# Patient Record
Sex: FEMALE | Race: WHITE | Employment: FULL TIME | URBAN - METROPOLITAN AREA
[De-identification: names, ages, dates, MRNs, and addresses within clinical notes are randomized per-mention and may not be internally consistent; named-entity substitution may affect disease eponyms.]

---

## 2017-02-02 ENCOUNTER — HOSPITAL ENCOUNTER (OUTPATIENT)
Dept: MRI IMAGING | Age: 42
Discharge: HOME | End: 2017-02-02
Attending: PHYSICIAN ASSISTANT
Payer: COMMERCIAL

## 2017-02-02 DIAGNOSIS — S83.241A TEAR OF MEDIAL MENISCUS OF RIGHT KNEE, UNSPECIFIED TEAR TYPE, UNSPECIFIED WHETHER OLD OR CURRENT TEAR, INITIAL ENCOUNTER: ICD-10-CM

## 2017-02-02 DIAGNOSIS — M25.561 PAIN IN BOTH KNEES: ICD-10-CM

## 2017-02-02 DIAGNOSIS — M25.562 PAIN IN BOTH KNEES: ICD-10-CM

## 2017-02-02 PROCEDURE — 73721 MRI JNT OF LWR EXTRE W/O DYE: CPT

## 2017-02-11 NOTE — H&P
[facility]    PATIENT'S NAME: Lucita Fuentes   ATTENDING PHYSICIAN: Yudith Burgos M.D.    PATIENT ACCOUNT#:   [de-identified]    LOCATION:  [facility]  MEDICAL RECORD #:   [id number]       YOB: 1975  ADMISSION DATE:       02/24/2017    HIS degenerative changes greatest in the patellofemoral joint and secondarily in the medial compartment. Right knee revealed early degenerative changes.   The patient was diagnosed with moderate osteoarthritis of her left knee and early osteoarthritis of her r 124/76. HEENT:  Unremarkable. LUNGS:  Clear. HEART:  Normal S1, S2 without murmur. ABDOMEN:  Benign. EXTREMITIES:  Significant tenderness along the medial joint line of the right knee. The patient has no medial or lateral instability.   Lachman and

## 2017-02-13 RX ORDER — MULTIVIT-MIN/IRON/FOLIC ACID/K 18-600-40
CAPSULE ORAL DAILY
COMMUNITY

## 2017-02-21 ENCOUNTER — ANESTHESIA EVENT (OUTPATIENT)
Dept: SURGERY | Facility: HOSPITAL | Age: 42
End: 2017-02-21
Payer: COMMERCIAL

## 2017-02-24 ENCOUNTER — HOSPITAL ENCOUNTER (OUTPATIENT)
Facility: HOSPITAL | Age: 42
Setting detail: HOSPITAL OUTPATIENT SURGERY
Discharge: HOME | End: 2017-02-24
Attending: ORTHOPAEDIC SURGERY | Admitting: ORTHOPAEDIC SURGERY
Payer: COMMERCIAL

## 2017-02-24 ENCOUNTER — ANESTHESIA (OUTPATIENT)
Dept: SURGERY | Facility: HOSPITAL | Age: 42
End: 2017-02-24
Payer: COMMERCIAL

## 2017-02-24 ENCOUNTER — SURGERY (OUTPATIENT)
Age: 42
End: 2017-02-24

## 2017-02-24 VITALS
WEIGHT: 190.69 LBS | OXYGEN SATURATION: 100 % | HEIGHT: 65 IN | SYSTOLIC BLOOD PRESSURE: 136 MMHG | RESPIRATION RATE: 16 BRPM | DIASTOLIC BLOOD PRESSURE: 68 MMHG | BODY MASS INDEX: 31.77 KG/M2 | TEMPERATURE: 99 F | HEART RATE: 80 BPM

## 2017-02-24 LAB
POCT LOT NUMBER: NORMAL
POCT URINE PREGNANCY: NEGATIVE

## 2017-02-24 PROCEDURE — 3E0U33Z INTRODUCTION OF ANTI-INFLAMMATORY INTO JOINTS, PERCUTANEOUS APPROACH: ICD-10-PCS | Performed by: ORTHOPAEDIC SURGERY

## 2017-02-24 PROCEDURE — 81025 URINE PREGNANCY TEST: CPT | Performed by: ORTHOPAEDIC SURGERY

## 2017-02-24 PROCEDURE — 0SBC4ZZ EXCISION OF RIGHT KNEE JOINT, PERCUTANEOUS ENDOSCOPIC APPROACH: ICD-10-PCS | Performed by: ORTHOPAEDIC SURGERY

## 2017-02-24 PROCEDURE — 3E0U3BZ INTRODUCTION OF ANESTHETIC AGENT INTO JOINTS, PERCUTANEOUS APPROACH: ICD-10-PCS | Performed by: ORTHOPAEDIC SURGERY

## 2017-02-24 RX ORDER — SODIUM CHLORIDE, SODIUM LACTATE, POTASSIUM CHLORIDE, CALCIUM CHLORIDE 600; 310; 30; 20 MG/100ML; MG/100ML; MG/100ML; MG/100ML
INJECTION, SOLUTION INTRAVENOUS CONTINUOUS
Status: DISCONTINUED | OUTPATIENT
Start: 2017-02-24 | End: 2017-02-24

## 2017-02-24 RX ORDER — HYDROMORPHONE HYDROCHLORIDE 1 MG/ML
0.4 INJECTION, SOLUTION INTRAMUSCULAR; INTRAVENOUS; SUBCUTANEOUS EVERY 5 MIN PRN
Status: DISCONTINUED | OUTPATIENT
Start: 2017-02-24 | End: 2017-02-24

## 2017-02-24 RX ORDER — METHYLPREDNISOLONE ACETATE 80 MG/ML
INJECTION, SUSPENSION INTRA-ARTICULAR; INTRALESIONAL; INTRAMUSCULAR; SOFT TISSUE AS NEEDED
Status: DISCONTINUED | OUTPATIENT
Start: 2017-02-24 | End: 2017-02-24 | Stop reason: HOSPADM

## 2017-02-24 RX ORDER — DIPHENHYDRAMINE HYDROCHLORIDE 50 MG/ML
12.5 INJECTION INTRAMUSCULAR; INTRAVENOUS AS NEEDED
Status: DISCONTINUED | OUTPATIENT
Start: 2017-02-24 | End: 2017-02-24

## 2017-02-24 RX ORDER — METOCLOPRAMIDE HYDROCHLORIDE 5 MG/ML
10 INJECTION INTRAMUSCULAR; INTRAVENOUS AS NEEDED
Status: DISCONTINUED | OUTPATIENT
Start: 2017-02-24 | End: 2017-02-24

## 2017-02-24 RX ORDER — LABETALOL HYDROCHLORIDE 5 MG/ML
5 INJECTION, SOLUTION INTRAVENOUS EVERY 5 MIN PRN
Status: DISCONTINUED | OUTPATIENT
Start: 2017-02-24 | End: 2017-02-24

## 2017-02-24 RX ORDER — SCOLOPAMINE TRANSDERMAL SYSTEM 1 MG/1
1 PATCH, EXTENDED RELEASE TRANSDERMAL
Status: DISCONTINUED | OUTPATIENT
Start: 2017-02-24 | End: 2017-02-24 | Stop reason: HOSPADM

## 2017-02-24 RX ORDER — BUPIVACAINE HYDROCHLORIDE AND EPINEPHRINE 5; 5 MG/ML; UG/ML
INJECTION, SOLUTION EPIDURAL; INTRACAUDAL; PERINEURAL AS NEEDED
Status: DISCONTINUED | OUTPATIENT
Start: 2017-02-24 | End: 2017-02-24 | Stop reason: HOSPADM

## 2017-02-24 RX ORDER — TRAMADOL HYDROCHLORIDE 50 MG/1
100 TABLET ORAL ONCE AS NEEDED
Status: COMPLETED | OUTPATIENT
Start: 2017-02-24 | End: 2017-02-24

## 2017-02-24 RX ORDER — MIDAZOLAM HYDROCHLORIDE 1 MG/ML
1 INJECTION INTRAMUSCULAR; INTRAVENOUS EVERY 5 MIN PRN
Status: DISCONTINUED | OUTPATIENT
Start: 2017-02-24 | End: 2017-02-24

## 2017-02-24 RX ORDER — NALOXONE HYDROCHLORIDE 0.4 MG/ML
80 INJECTION, SOLUTION INTRAMUSCULAR; INTRAVENOUS; SUBCUTANEOUS AS NEEDED
Status: DISCONTINUED | OUTPATIENT
Start: 2017-02-24 | End: 2017-02-24

## 2017-02-24 RX ORDER — HYDROCODONE BITARTRATE AND ACETAMINOPHEN 5; 325 MG/1; MG/1
1 TABLET ORAL AS NEEDED
Status: DISCONTINUED | OUTPATIENT
Start: 2017-02-24 | End: 2017-02-24

## 2017-02-24 RX ORDER — ONDANSETRON 2 MG/ML
4 INJECTION INTRAMUSCULAR; INTRAVENOUS AS NEEDED
Status: DISCONTINUED | OUTPATIENT
Start: 2017-02-24 | End: 2017-02-24

## 2017-02-24 RX ORDER — HYDROCODONE BITARTRATE AND ACETAMINOPHEN 5; 325 MG/1; MG/1
1-2 TABLET ORAL EVERY 4 HOURS PRN
Qty: 40 TABLET | Refills: 0 | Status: SHIPPED | OUTPATIENT
Start: 2017-02-24 | End: 2017-03-06

## 2017-02-24 RX ORDER — MEPERIDINE HYDROCHLORIDE 25 MG/ML
12.5 INJECTION INTRAMUSCULAR; INTRAVENOUS; SUBCUTANEOUS AS NEEDED
Status: DISCONTINUED | OUTPATIENT
Start: 2017-02-24 | End: 2017-02-24

## 2017-02-24 RX ORDER — ACETAMINOPHEN 500 MG
1000 TABLET ORAL ONCE AS NEEDED
Status: DISCONTINUED | OUTPATIENT
Start: 2017-02-24 | End: 2017-02-24

## 2017-02-24 RX ORDER — HYDROCODONE BITARTRATE AND ACETAMINOPHEN 5; 325 MG/1; MG/1
2 TABLET ORAL AS NEEDED
Status: DISCONTINUED | OUTPATIENT
Start: 2017-02-24 | End: 2017-02-24

## 2017-02-24 RX ORDER — SCOLOPAMINE TRANSDERMAL SYSTEM 1 MG/1
PATCH, EXTENDED RELEASE TRANSDERMAL
Status: DISCONTINUED
Start: 2017-02-24 | End: 2017-02-24

## 2017-02-24 NOTE — INTERVAL H&P NOTE
Pre-op Diagnosis: TORN MEDIAL MENISCUS RIGHT KNEE AND OSTEOARTHRITIS LEFT KNEE    The above referenced H&P was reviewed by MICHAEL Che on 2/24/2017, the patient was examined and no significant changes have occurred in the patient's condition since th

## 2017-02-24 NOTE — ANESTHESIA PREPROCEDURE EVALUATION
PRE-OP EVALUATION    Patient Name: Camelia Bustillos    Pre-op Diagnosis: TORN MEDIAL MENISCUS RIGHT KNEE AND OSTEOARTHRITIS LEFT KNEE    Procedure(s):  RIGHT KNEE ARTHROSCOPY WITH PARTIAL MEDIAL MENISCECTOMY AND CORTISONE INJECTION TO LEFT KNEE    Surgeon Smokeless tobacco: Never Used    Alcohol Use: Yes    Comment: rare, 2-3 per month       Drug Use: No     Available pre-op labs reviewed.                Airway      Mallampati: II  Mouth opening: >3 FB  TM distance: > 6 cm  Neck ROM: full Cardiovascular

## 2017-02-24 NOTE — BRIEF OP NOTE
[address]  Brief Op Note     [place] Location: OR   [facility] [id number] MRN [id number]   Admission Date 2/24/2017 Operation Date 2/24/2017   Attending Physician Anthony Tovar MD Operating Physician UBALDO Garcia

## 2017-02-24 NOTE — ANESTHESIA POSTPROCEDURE EVALUATION
[address] Patient Status:  Hospital Outpatient Surgery   Age/Gender 39year old female MRN [id number]   [facility] SURGERY Attending Jarod Mcfarlane MD   Hosp Day # 0 PCP Fidelina lAmaguer MD       Anesthesia Post-op Not

## 2017-02-24 NOTE — OPERATIVE REPORT
[address]    PATIENT'S NAME: Eduardo Martinez   ATTENDING PHYSICIAN: Shayy Chaudhary M.D. OPERATING PHYSICIAN: Shayy Chuadhary M.D.    PATIENT ACCOUNT#:   [de-identified]    LOCATION:  [address] [place] [address] ED 10  MEDICAL RECORD #:   [id number]       DA patient had a small joint effusion which was evacuated. A second stab incision was along the lateral joint line just lateral to the patellar tendon. Through this stab incision, a dull trocar was passed and a cannula for the arthroscope.   The suprapatella with patient's father, , and son, and postoperative instructions were written. The patient tolerated the procedure well.       Dictated By Vic Monteiro M.D.  d: 02/24/2017 09:57:56  t: 02/24/2017 16:31:28  [facility] [id number]  KFW/    cc: Arabella Negro

## 2017-02-27 PROBLEM — Z47.89 ORTHOPEDIC AFTERCARE: Status: ACTIVE | Noted: 2017-02-27

## 2017-03-24 ENCOUNTER — HOSPITAL ENCOUNTER (OUTPATIENT)
Age: 42
Discharge: HOME | End: 2017-03-24
Attending: FAMILY MEDICINE
Payer: COMMERCIAL

## 2017-03-24 VITALS
DIASTOLIC BLOOD PRESSURE: 92 MMHG | TEMPERATURE: 99 F | SYSTOLIC BLOOD PRESSURE: 123 MMHG | BODY MASS INDEX: 32 KG/M2 | WEIGHT: 190 LBS | HEART RATE: 96 BPM | OXYGEN SATURATION: 99 % | RESPIRATION RATE: 16 BRPM

## 2017-03-24 DIAGNOSIS — H83.09 LABYRINTHITIS, UNSPECIFIED LATERALITY: ICD-10-CM

## 2017-03-24 DIAGNOSIS — J01.00 ACUTE MAXILLARY SINUSITIS, RECURRENCE NOT SPECIFIED: Primary | ICD-10-CM

## 2017-03-24 PROCEDURE — 99204 OFFICE O/P NEW MOD 45 MIN: CPT

## 2017-03-24 PROCEDURE — 99213 OFFICE O/P EST LOW 20 MIN: CPT

## 2017-03-24 RX ORDER — AMOXICILLIN AND CLAVULANATE POTASSIUM 875; 125 MG/1; MG/1
1 TABLET, FILM COATED ORAL 2 TIMES DAILY
Qty: 20 TABLET | Refills: 0 | Status: SHIPPED | OUTPATIENT
Start: 2017-03-24 | End: 2017-04-03

## 2017-03-24 RX ORDER — FLUTICASONE PROPIONATE 50 MCG
2 SPRAY, SUSPENSION (ML) NASAL DAILY
Qty: 16 G | Refills: 0 | Status: SHIPPED | OUTPATIENT
Start: 2017-03-24 | End: 2017-04-23

## 2017-03-24 RX ORDER — TRAMADOL HYDROCHLORIDE 50 MG/1
50 TABLET ORAL EVERY 6 HOURS PRN
COMMUNITY
End: 2018-07-13 | Stop reason: ALTCHOICE

## 2017-03-24 RX ORDER — MECLIZINE HYDROCHLORIDE 25 MG/1
25 TABLET ORAL 3 TIMES DAILY PRN
Qty: 9 TABLET | Refills: 0 | Status: SHIPPED | OUTPATIENT
Start: 2017-03-24 | End: 2017-03-27

## 2017-03-24 RX ORDER — ACETAMINOPHEN 500 MG
1000 TABLET ORAL ONCE
Status: COMPLETED | OUTPATIENT
Start: 2017-03-24 | End: 2017-03-24

## 2017-03-24 NOTE — ED PROVIDER NOTES
Patient Seen in: [id number] [facility]    History   Patient presents with:  Cough/URI    Stated Complaint: sinus pressure    HPI  38 yo F here with sinus congestion and drainage X 2 days and now getting worse   Overall symptoms started approxim HPI.  Constitutional and vital signs reviewed. All other systems reviewed and negative except as noted above. PSFH elements reviewed from today and agreed except as otherwise stated in HPI.     Physical Exam       ED Triage Vitals   BP 03/24/17 1107 risk with a cold.  If your symptoms worsen, you become short-of-breath, develop fever, worse cough etc, you must follow-up with a physician immediately or go to the emergency room or return to [facility] immediate care  · Mucinex to help with thinning out

## 2017-03-31 ENCOUNTER — CHARTING TRANS (OUTPATIENT)
Dept: OTHER | Age: 42
End: 2017-03-31

## 2017-04-02 ENCOUNTER — LAB SERVICES (OUTPATIENT)
Dept: OTHER | Age: 42
End: 2017-04-02

## 2017-04-02 ENCOUNTER — CHARTING TRANS (OUTPATIENT)
Dept: OTHER | Age: 42
End: 2017-04-02

## 2017-04-02 LAB — SKIN TEST, TB IN-OFFICE: NEGATIVE

## 2018-03-14 ENCOUNTER — APPOINTMENT (OUTPATIENT)
Dept: OTHER | Facility: HOSPITAL | Age: 43
End: 2018-03-14
Attending: FAMILY MEDICINE

## 2018-03-16 ENCOUNTER — APPOINTMENT (OUTPATIENT)
Dept: OTHER | Facility: HOSPITAL | Age: 43
End: 2018-03-16
Attending: PREVENTIVE MEDICINE

## 2018-07-13 ENCOUNTER — OFFICE VISIT (OUTPATIENT)
Dept: UROLOGY | Facility: HOSPITAL | Age: 43
End: 2018-07-13
Attending: OBSTETRICS & GYNECOLOGY
Payer: COMMERCIAL

## 2018-07-13 VITALS
BODY MASS INDEX: 31.65 KG/M2 | WEIGHT: 190 LBS | HEIGHT: 65 IN | SYSTOLIC BLOOD PRESSURE: 104 MMHG | DIASTOLIC BLOOD PRESSURE: 72 MMHG

## 2018-07-13 DIAGNOSIS — N81.2 UTEROVAGINAL PROLAPSE, INCOMPLETE: Primary | ICD-10-CM

## 2018-07-13 DIAGNOSIS — N39.3 STRESS INCONTINENCE: ICD-10-CM

## 2018-07-13 DIAGNOSIS — N81.84 PELVIC MUSCLE WASTING: ICD-10-CM

## 2018-07-13 LAB
BLOOD URINE: NEGATIVE
CONTROL RUN WITHIN 24 HOURS?: YES
LEUKOCYTE ESTERASE URINE: NEGATIVE
NITRITE URINE: NEGATIVE

## 2018-07-13 PROCEDURE — 99201 HC OUTPT EVAL AND MGNT NEW PT LEVEL 1: CPT

## 2018-07-13 PROCEDURE — 87086 URINE CULTURE/COLONY COUNT: CPT | Performed by: OBSTETRICS & GYNECOLOGY

## 2018-07-13 PROCEDURE — 81002 URINALYSIS NONAUTO W/O SCOPE: CPT

## 2018-07-13 NOTE — PROGRESS NOTES
Alicia Christian,   7/13/2018     Referred by self    Patient presents with:  Prolapse: pt c/o bulge in vagina, also leakign with activity and ont he way to the bathroom      HPI:  +GINA  + UUI  (GINA > UUI)  +prolapse, worsening  Splints for void & comfort mouth every 6 (six) hours as needed for Pain. Disp:  Rfl:      No facility-administered medications prior to visit.      Urogynecology Summary:  Urogynecology Summary  Prolapse: Yes  GINA: Yes  Urge Incontinence: Yes  Nocturia Frequency: 2  Frequency: Greate recurrent prolapse, recurrent stress incontinence, de basil OAB, pain, sexual dysfunction, voiding dysfunction, long-term catheterization and re-operation. Post-op restrictions and expectations reviewed.   Discussed dietary and behavioral modification, disc

## 2018-07-16 ENCOUNTER — TELEPHONE (OUTPATIENT)
Dept: UROLOGY | Facility: HOSPITAL | Age: 43
End: 2018-07-16

## 2018-08-29 ENCOUNTER — APPOINTMENT (OUTPATIENT)
Dept: LAB | Age: 43
End: 2018-08-29
Attending: OBSTETRICS & GYNECOLOGY
Payer: COMMERCIAL

## 2018-08-29 DIAGNOSIS — N81.4 UTEROVAGINAL PROLAPSE: ICD-10-CM

## 2018-08-29 LAB
ERYTHROCYTE [DISTWIDTH] IN BLOOD BY AUTOMATED COUNT: 13.1 % (ref 11.5–16)
HCT VFR BLD AUTO: 35.7 % (ref 34–50)
HGB BLD-MCNC: 12.4 G/DL (ref 12–16)
MCH RBC QN AUTO: 28.6 PG (ref 27–33.2)
MCHC RBC AUTO-ENTMCNC: 34.7 G/DL (ref 31–37)
MCV RBC AUTO: 82.4 FL (ref 81–100)
PLATELET # BLD AUTO: 247 10(3)UL (ref 150–450)
RBC # BLD AUTO: 4.33 X10(6)UL (ref 3.8–5.1)
RED CELL DISTRIBUTION WIDTH-SD: 38.7 FL (ref 35.1–46.3)
WBC # BLD AUTO: 7.6 X10(3) UL (ref 4–13)

## 2018-08-29 PROCEDURE — 85027 COMPLETE CBC AUTOMATED: CPT

## 2018-08-29 PROCEDURE — 36415 COLL VENOUS BLD VENIPUNCTURE: CPT

## 2018-09-05 ENCOUNTER — ANESTHESIA EVENT (OUTPATIENT)
Dept: SURGERY | Facility: HOSPITAL | Age: 43
End: 2018-09-05

## 2018-09-05 NOTE — H&P
44 y/o female with bothersome vaginal bulging and stress urinary incontinence. Symptoms worsening over time. Interested in surgical management. Completed childbearing, had essure procedure, wants fallopian tubes removed entirely.     She reports hole in la pain.  Discussed pain mgmt and potential need for narcotics. Discussed addiction potential with narcotics. IL  reviewed.     All questions answered  Will proceed with robotic assisted laparoscopic hysterectomy, bilateral salpingectomy, poss oophorectomy,

## 2018-09-06 ENCOUNTER — HOSPITAL ENCOUNTER (OUTPATIENT)
Facility: HOSPITAL | Age: 43
Setting detail: OBSERVATION
Discharge: HOME | End: 2018-09-08
Attending: OBSTETRICS & GYNECOLOGY | Admitting: OBSTETRICS & GYNECOLOGY
Payer: COMMERCIAL

## 2018-09-06 ENCOUNTER — ANESTHESIA (OUTPATIENT)
Dept: SURGERY | Facility: HOSPITAL | Age: 43
End: 2018-09-06

## 2018-09-06 DIAGNOSIS — N81.4 UTEROVAGINAL PROLAPSE: Primary | ICD-10-CM

## 2018-09-06 LAB
POCT LOT NUMBER: NORMAL
POCT URINE PREGNANCY: NEGATIVE

## 2018-09-06 PROCEDURE — 81025 URINE PREGNANCY TEST: CPT | Performed by: OBSTETRICS & GYNECOLOGY

## 2018-09-06 PROCEDURE — 0UT94ZZ RESECTION OF UTERUS, PERCUTANEOUS ENDOSCOPIC APPROACH: ICD-10-PCS | Performed by: OBSTETRICS & GYNECOLOGY

## 2018-09-06 PROCEDURE — 0TSD0ZZ REPOSITION URETHRA, OPEN APPROACH: ICD-10-PCS | Performed by: OBSTETRICS & GYNECOLOGY

## 2018-09-06 PROCEDURE — 88307 TISSUE EXAM BY PATHOLOGIST: CPT | Performed by: OBSTETRICS & GYNECOLOGY

## 2018-09-06 PROCEDURE — 0WQNXZZ REPAIR FEMALE PERINEUM, EXTERNAL APPROACH: ICD-10-PCS | Performed by: OBSTETRICS & GYNECOLOGY

## 2018-09-06 PROCEDURE — 0JQC0ZZ REPAIR PELVIC REGION SUBCUTANEOUS TISSUE AND FASCIA, OPEN APPROACH: ICD-10-PCS | Performed by: OBSTETRICS & GYNECOLOGY

## 2018-09-06 PROCEDURE — 0UT74ZZ RESECTION OF BILATERAL FALLOPIAN TUBES, PERCUTANEOUS ENDOSCOPIC APPROACH: ICD-10-PCS | Performed by: OBSTETRICS & GYNECOLOGY

## 2018-09-06 PROCEDURE — 8E0W4CZ ROBOTIC ASSISTED PROCEDURE OF TRUNK REGION, PERCUTANEOUS ENDOSCOPIC APPROACH: ICD-10-PCS | Performed by: OBSTETRICS & GYNECOLOGY

## 2018-09-06 PROCEDURE — 0UQF0ZZ REPAIR CUL-DE-SAC, OPEN APPROACH: ICD-10-PCS | Performed by: OBSTETRICS & GYNECOLOGY

## 2018-09-06 PROCEDURE — 0UBMXZZ EXCISION OF VULVA, EXTERNAL APPROACH: ICD-10-PCS | Performed by: OBSTETRICS & GYNECOLOGY

## 2018-09-06 PROCEDURE — S0028 INJECTION, FAMOTIDINE, 20 MG: HCPCS | Performed by: OBSTETRICS & GYNECOLOGY

## 2018-09-06 RX ORDER — CEFAZOLIN SODIUM/WATER 2 G/20 ML
2 SYRINGE (ML) INTRAVENOUS ONCE
Status: DISCONTINUED | OUTPATIENT
Start: 2018-09-06 | End: 2018-09-06 | Stop reason: HOSPADM

## 2018-09-06 RX ORDER — ACETAMINOPHEN 500 MG
1000 TABLET ORAL ONCE
Status: DISCONTINUED | OUTPATIENT
Start: 2018-09-06 | End: 2018-09-06 | Stop reason: HOSPADM

## 2018-09-06 RX ORDER — MEPERIDINE HYDROCHLORIDE 25 MG/ML
12.5 INJECTION INTRAMUSCULAR; INTRAVENOUS; SUBCUTANEOUS AS NEEDED
Status: COMPLETED | OUTPATIENT
Start: 2018-09-06 | End: 2018-09-06

## 2018-09-06 RX ORDER — MORPHINE SULFATE 4 MG/ML
INJECTION, SOLUTION INTRAMUSCULAR; INTRAVENOUS
Status: COMPLETED
Start: 2018-09-06 | End: 2018-09-06

## 2018-09-06 RX ORDER — ONDANSETRON 4 MG/1
4 TABLET, FILM COATED ORAL EVERY 8 HOURS PRN
Status: DISCONTINUED | OUTPATIENT
Start: 2018-09-06 | End: 2018-09-08

## 2018-09-06 RX ORDER — MORPHINE SULFATE 4 MG/ML
2 INJECTION, SOLUTION INTRAMUSCULAR; INTRAVENOUS EVERY 2 HOUR PRN
Status: DISCONTINUED | OUTPATIENT
Start: 2018-09-06 | End: 2018-09-08

## 2018-09-06 RX ORDER — FAMOTIDINE 10 MG/ML
20 INJECTION, SOLUTION INTRAVENOUS 2 TIMES DAILY
Status: DISCONTINUED | OUTPATIENT
Start: 2018-09-06 | End: 2018-09-08

## 2018-09-06 RX ORDER — ONDANSETRON 2 MG/ML
4 INJECTION INTRAMUSCULAR; INTRAVENOUS AS NEEDED
Status: DISCONTINUED | OUTPATIENT
Start: 2018-09-06 | End: 2018-09-06 | Stop reason: HOSPADM

## 2018-09-06 RX ORDER — DIPHENHYDRAMINE HYDROCHLORIDE 50 MG/ML
12.5 INJECTION INTRAMUSCULAR; INTRAVENOUS AS NEEDED
Status: DISCONTINUED | OUTPATIENT
Start: 2018-09-06 | End: 2018-09-06 | Stop reason: HOSPADM

## 2018-09-06 RX ORDER — MORPHINE SULFATE 4 MG/ML
4 INJECTION, SOLUTION INTRAMUSCULAR; INTRAVENOUS EVERY 2 HOUR PRN
Status: DISCONTINUED | OUTPATIENT
Start: 2018-09-06 | End: 2018-09-08

## 2018-09-06 RX ORDER — PSEUDOEPHEDRINE HCL 120 MG/1
120 TABLET, FILM COATED, EXTENDED RELEASE ORAL DAILY
Status: DISCONTINUED | OUTPATIENT
Start: 2018-09-06 | End: 2018-09-08

## 2018-09-06 RX ORDER — MIDAZOLAM HYDROCHLORIDE 1 MG/ML
1 INJECTION INTRAMUSCULAR; INTRAVENOUS EVERY 5 MIN PRN
Status: DISCONTINUED | OUTPATIENT
Start: 2018-09-06 | End: 2018-09-06 | Stop reason: HOSPADM

## 2018-09-06 RX ORDER — CETIRIZINE HYDROCHLORIDE 5 MG/1
5 TABLET ORAL DAILY
Status: DISCONTINUED | OUTPATIENT
Start: 2018-09-06 | End: 2018-09-08

## 2018-09-06 RX ORDER — DEXAMETHASONE SODIUM PHOSPHATE 4 MG/ML
4 VIAL (ML) INJECTION AS NEEDED
Status: DISCONTINUED | OUTPATIENT
Start: 2018-09-06 | End: 2018-09-06 | Stop reason: HOSPADM

## 2018-09-06 RX ORDER — ONDANSETRON 2 MG/ML
4 INJECTION INTRAMUSCULAR; INTRAVENOUS EVERY 8 HOURS PRN
Status: DISCONTINUED | OUTPATIENT
Start: 2018-09-06 | End: 2018-09-08

## 2018-09-06 RX ORDER — KETOROLAC TROMETHAMINE 30 MG/ML
30 INJECTION, SOLUTION INTRAMUSCULAR; INTRAVENOUS EVERY 6 HOURS
Status: DISCONTINUED | OUTPATIENT
Start: 2018-09-06 | End: 2018-09-07

## 2018-09-06 RX ORDER — KETOROLAC TROMETHAMINE 30 MG/ML
30 INJECTION, SOLUTION INTRAMUSCULAR; INTRAVENOUS EVERY 6 HOURS
Status: DISCONTINUED | OUTPATIENT
Start: 2018-09-06 | End: 2018-09-06

## 2018-09-06 RX ORDER — HYDROMORPHONE HYDROCHLORIDE 1 MG/ML
0.8 INJECTION, SOLUTION INTRAMUSCULAR; INTRAVENOUS; SUBCUTANEOUS EVERY 2 HOUR PRN
Status: DISCONTINUED | OUTPATIENT
Start: 2018-09-06 | End: 2018-09-06

## 2018-09-06 RX ORDER — HYDROMORPHONE HYDROCHLORIDE 1 MG/ML
0.2 INJECTION, SOLUTION INTRAMUSCULAR; INTRAVENOUS; SUBCUTANEOUS EVERY 2 HOUR PRN
Status: DISCONTINUED | OUTPATIENT
Start: 2018-09-06 | End: 2018-09-06

## 2018-09-06 RX ORDER — SODIUM CHLORIDE, SODIUM LACTATE, POTASSIUM CHLORIDE, CALCIUM CHLORIDE 600; 310; 30; 20 MG/100ML; MG/100ML; MG/100ML; MG/100ML
INJECTION, SOLUTION INTRAVENOUS CONTINUOUS
Status: DISCONTINUED | OUTPATIENT
Start: 2018-09-06 | End: 2018-09-06 | Stop reason: HOSPADM

## 2018-09-06 RX ORDER — MORPHINE SULFATE 4 MG/ML
2 INJECTION, SOLUTION INTRAMUSCULAR; INTRAVENOUS EVERY 5 MIN PRN
Status: DISCONTINUED | OUTPATIENT
Start: 2018-09-06 | End: 2018-09-06 | Stop reason: HOSPADM

## 2018-09-06 RX ORDER — DIPHENHYDRAMINE HYDROCHLORIDE 50 MG/ML
12.5 INJECTION INTRAMUSCULAR; INTRAVENOUS EVERY 4 HOURS PRN
Status: DISCONTINUED | OUTPATIENT
Start: 2018-09-06 | End: 2018-09-08

## 2018-09-06 RX ORDER — HEPARIN SODIUM 5000 [USP'U]/ML
5000 INJECTION, SOLUTION INTRAVENOUS; SUBCUTANEOUS ONCE
Status: COMPLETED | OUTPATIENT
Start: 2018-09-06 | End: 2018-09-06

## 2018-09-06 RX ORDER — BUPIVACAINE HYDROCHLORIDE AND EPINEPHRINE 2.5; 5 MG/ML; UG/ML
INJECTION, SOLUTION EPIDURAL; INFILTRATION; INTRACAUDAL; PERINEURAL AS NEEDED
Status: DISCONTINUED | OUTPATIENT
Start: 2018-09-06 | End: 2018-09-06

## 2018-09-06 RX ORDER — METOCLOPRAMIDE HYDROCHLORIDE 5 MG/ML
10 INJECTION INTRAMUSCULAR; INTRAVENOUS EVERY 8 HOURS PRN
Status: DISCONTINUED | OUTPATIENT
Start: 2018-09-06 | End: 2018-09-08

## 2018-09-06 RX ORDER — MEPERIDINE HYDROCHLORIDE 25 MG/ML
INJECTION INTRAMUSCULAR; INTRAVENOUS; SUBCUTANEOUS
Status: COMPLETED
Start: 2018-09-06 | End: 2018-09-06

## 2018-09-06 RX ORDER — HYDROMORPHONE HYDROCHLORIDE 1 MG/ML
0.4 INJECTION, SOLUTION INTRAMUSCULAR; INTRAVENOUS; SUBCUTANEOUS EVERY 2 HOUR PRN
Status: DISCONTINUED | OUTPATIENT
Start: 2018-09-06 | End: 2018-09-06

## 2018-09-06 RX ORDER — MORPHINE SULFATE 4 MG/ML
1 INJECTION, SOLUTION INTRAMUSCULAR; INTRAVENOUS EVERY 2 HOUR PRN
Status: DISCONTINUED | OUTPATIENT
Start: 2018-09-06 | End: 2018-09-08

## 2018-09-06 RX ORDER — BUPIVACAINE HYDROCHLORIDE 5 MG/ML
INJECTION, SOLUTION EPIDURAL; INTRACAUDAL AS NEEDED
Status: DISCONTINUED | OUTPATIENT
Start: 2018-09-06 | End: 2018-09-06

## 2018-09-06 RX ORDER — DIPHENHYDRAMINE HCL 25 MG
25 CAPSULE ORAL EVERY 6 HOURS PRN
Status: DISCONTINUED | OUTPATIENT
Start: 2018-09-06 | End: 2018-09-08

## 2018-09-06 RX ORDER — NALOXONE HYDROCHLORIDE 0.4 MG/ML
80 INJECTION, SOLUTION INTRAMUSCULAR; INTRAVENOUS; SUBCUTANEOUS AS NEEDED
Status: DISCONTINUED | OUTPATIENT
Start: 2018-09-06 | End: 2018-09-06 | Stop reason: HOSPADM

## 2018-09-06 RX ORDER — SODIUM CHLORIDE, SODIUM LACTATE, POTASSIUM CHLORIDE, CALCIUM CHLORIDE 600; 310; 30; 20 MG/100ML; MG/100ML; MG/100ML; MG/100ML
INJECTION, SOLUTION INTRAVENOUS CONTINUOUS
Status: DISCONTINUED | OUTPATIENT
Start: 2018-09-06 | End: 2018-09-08

## 2018-09-06 NOTE — OPERATIVE REPORT
Pre op: Uterovaginal prolapse, stress urinary incontinence, labial disruption  Post op: same + endometriosis    PROCEDURE PERFORMED:    Diagnostic Laparoscopy, Robotic assisted laparoscopic hysterectomy, bilateral salpingectomy, uterosacral vaginal vault s cut. Round ligament was clamped, cauterized, & cut. Broad ligament was , uterine vessels skeletonized and clamped/cauterized, and cut, this was done on either side without difficulty.  Bladder flap was created sharply and with cup of the v-care pap dissect the periurethral tissues towards the pubic rami bilaterally. The bladder was emptied, the Advantage Fit trocars were then used to place the guide sleeves into the retropubic space. Once the sleeves were in position, cystoscopy was undertaken.  No b the fascia at the umbilical incision.  The remaining sleeves were then removed under direct visualization.  The skin incisions were then closed with 4-0 Vicryl suture in a subcuticular fashion.  Dermabond was then applied to the incisions, and the patient

## 2018-09-06 NOTE — BRIEF OP NOTE
Pre-Operative Diagnosis: UTEROVAGINAL PROLAPSE, STRESS INCONTINENCE, RIGHT LABIAL DEFECT     Post-Operative Diagnosis: UTEROVAGINAL PROLAPSE, STRESS INCONTINENCE, ENDOMETRIOSIS, RIGHT LABIAL DEFECT   + endometriosis     Procedure Performed:   Procedure(s):

## 2018-09-06 NOTE — ANESTHESIA POSTPROCEDURE EVALUATION
[address] Patient Status:  Outpatient in a Bed   Age/Gender 43year old female MRN [id number]   [facility] SURGERY Attending Aura , [address] [facility] Day # 0 PCP Esperanza Carlson MD       Anesthesia Post-op Note    P

## 2018-09-06 NOTE — ANESTHESIA PREPROCEDURE EVALUATION
PRE-OP EVALUATION    Patient Name: Aaron Marino    Pre-op Diagnosis: UTEROVAGINAL PROLAPSE, STRESS INCONTINENCE      Procedure(s):  ROBOTIC ASSISTED LAPAROSCOPIC HYSTERECTOMY, BILATERAL SALPINGECTOMY, POSSIBLE BILATERAL OOPHORECTOMY, VAGINAL Sherine Shahid surgery, nose fx  No date: OTHER SURGICAL HISTORY Right      Comment: knee arthroscopy x 2  No date: OTHER SURGICAL HISTORY      Comment: hysteroscopy     Smoking status: Never Smoker    Smokeless tobacco: Never Used    Alcohol use Yes    Comment: 2-3 per

## 2018-09-07 LAB
BASOPHILS # BLD AUTO: 0.02 X10(3) UL (ref 0–0.1)
BASOPHILS NFR BLD AUTO: 0.1 %
EOSINOPHIL # BLD AUTO: 0 X10(3) UL (ref 0–0.3)
EOSINOPHIL NFR BLD AUTO: 0 %
ERYTHROCYTE [DISTWIDTH] IN BLOOD BY AUTOMATED COUNT: 13 % (ref 11.5–16)
HCT VFR BLD AUTO: 33.9 % (ref 34–50)
HGB BLD-MCNC: 11.9 G/DL (ref 12–16)
IMMATURE GRANULOCYTE COUNT: 0.04 X10(3) UL (ref 0–1)
IMMATURE GRANULOCYTE RATIO %: 0.3 %
LYMPHOCYTES # BLD AUTO: 1.16 X10(3) UL (ref 0.9–4)
LYMPHOCYTES NFR BLD AUTO: 8.6 %
MCH RBC QN AUTO: 28.6 PG (ref 27–33.2)
MCHC RBC AUTO-ENTMCNC: 35.1 G/DL (ref 31–37)
MCV RBC AUTO: 81.5 FL (ref 81–100)
MONOCYTES # BLD AUTO: 1.19 X10(3) UL (ref 0.1–1)
MONOCYTES NFR BLD AUTO: 8.8 %
NEUTROPHIL ABS PRELIM: 11.07 X10 (3) UL (ref 1.3–6.7)
NEUTROPHILS # BLD AUTO: 11.07 X10(3) UL (ref 1.3–6.7)
NEUTROPHILS NFR BLD AUTO: 82.2 %
PLATELET # BLD AUTO: 251 10(3)UL (ref 150–450)
RBC # BLD AUTO: 4.16 X10(6)UL (ref 3.8–5.1)
RED CELL DISTRIBUTION WIDTH-SD: 37.9 FL (ref 35.1–46.3)
WBC # BLD AUTO: 13.5 X10(3) UL (ref 4–13)

## 2018-09-07 PROCEDURE — 85025 COMPLETE CBC W/AUTO DIFF WBC: CPT | Performed by: OBSTETRICS & GYNECOLOGY

## 2018-09-07 PROCEDURE — S0028 INJECTION, FAMOTIDINE, 20 MG: HCPCS | Performed by: OBSTETRICS & GYNECOLOGY

## 2018-09-07 RX ORDER — PHENAZOPYRIDINE HYDROCHLORIDE 100 MG/1
100 TABLET, FILM COATED ORAL
Status: DISCONTINUED | OUTPATIENT
Start: 2018-09-07 | End: 2018-09-08

## 2018-09-07 RX ORDER — TRAMADOL HYDROCHLORIDE 50 MG/1
50 TABLET ORAL EVERY 6 HOURS PRN
Qty: 20 TABLET | Refills: 0 | Status: SHIPPED | OUTPATIENT
Start: 2018-09-07 | End: 2018-09-13

## 2018-09-07 RX ORDER — IBUPROFEN 600 MG/1
600 TABLET ORAL EVERY 6 HOURS PRN
Status: DISCONTINUED | OUTPATIENT
Start: 2018-09-07 | End: 2018-09-08

## 2018-09-07 RX ORDER — ONDANSETRON 4 MG/1
4 TABLET, FILM COATED ORAL EVERY 8 HOURS PRN
Qty: 20 TABLET | Refills: 1 | Status: SHIPPED | OUTPATIENT
Start: 2018-09-07 | End: 2018-09-13

## 2018-09-07 RX ORDER — HYDROCODONE BITARTRATE AND ACETAMINOPHEN 5; 325 MG/1; MG/1
1 TABLET ORAL EVERY 6 HOURS PRN
Status: DISCONTINUED | OUTPATIENT
Start: 2018-09-07 | End: 2018-09-08

## 2018-09-07 RX ORDER — ACETAMINOPHEN 325 MG/1
650 TABLET ORAL EVERY 6 HOURS PRN
Status: DISCONTINUED | OUTPATIENT
Start: 2018-09-07 | End: 2018-09-08

## 2018-09-07 RX ORDER — HYDROCODONE BITARTRATE AND ACETAMINOPHEN 5; 325 MG/1; MG/1
1 TABLET ORAL EVERY 6 HOURS PRN
Qty: 20 TABLET | Refills: 0 | Status: SHIPPED | OUTPATIENT
Start: 2018-09-07 | End: 2018-09-13

## 2018-09-07 RX ORDER — IBUPROFEN 600 MG/1
600 TABLET ORAL EVERY 6 HOURS PRN
Qty: 30 TABLET | Refills: 3 | Status: SHIPPED | OUTPATIENT
Start: 2018-09-07

## 2018-09-07 RX ORDER — TRAMADOL HYDROCHLORIDE 50 MG/1
50 TABLET ORAL EVERY 6 HOURS PRN
Status: DISCONTINUED | OUTPATIENT
Start: 2018-09-07 | End: 2018-09-08

## 2018-09-07 RX ORDER — PHENAZOPYRIDINE HYDROCHLORIDE 100 MG/1
100 TABLET, FILM COATED ORAL
Qty: 21 TABLET | Refills: 1 | Status: SHIPPED | OUTPATIENT
Start: 2018-09-07 | End: 2020-06-11 | Stop reason: ALTCHOICE

## 2018-09-07 NOTE — PROGRESS NOTES
Operative findings discussed.     Pain controlled, tolerating PO    POD 1 - RH, BS, repairs, MUS - doing well    PLAN - discussed pain mangement and catheter care

## 2018-09-07 NOTE — PAYOR COMM NOTE
--------------  ADMISSION REVIEW     Payor: [employer]  Subscriber #:  [id number]  Authorization Number: N/A    Admit date: N/A  Admit time: N/A       Admitting Physician: Rosa M Carter DO  Attending Physician:  DO UBALDO Santillan device to occlude fallopian tubes  1/1/1991: OTHER SURGICAL HISTORY      Comment: nose surgery, nose fx  No date: OTHER SURGICAL HISTORY Right      Comment: knee arthroscopy x 2  No date: OTHER SURGICAL HISTORY      Comment: hysteroscopy    No current faci Bupivacaine-EPINEPHrine (PF) (MARCAINE) 0.25% -[id number] injection     Date Action Dose Route User    9/6/2018 1239 Given 30 mL Infiltration Opal Cardoso DO      ceFAZolin sodium (ANCEF/KEFZOL) 2 GM/20ML premix IV syringe 2 g     Date Action Dose Rou Ferdinand Mcduffie RN    9/6/2018 1641 Given 12.5 mg Intravenous Merlin Majano, RN      morphINE sulfate (PF) 4 MG/ML injection 2 mg     Date Action Dose Route User    9/7/2018 0019 Given 2 mg Intravenous Lora Acosta RN    9/6/2018 2032 Given 2 mg Intravenous

## 2018-09-07 NOTE — PROGRESS NOTES
43year old y/o female s/p RALH b/l salpingectomy USVVS APE repair, MUS, cysto, labial revision POD #1  Pt doing well. She complains of suprapubic pressure & burning. Denies CP or SOB. Pain controlled with IV meds, morphine makes her feel funny.  Tolerates

## 2018-09-07 NOTE — PLAN OF CARE
PT RESTING IN BED. C/O OF OF ABD PAIN, REPORTED PAIN RELIEF AFTER PAIN MEDS. WILL TRANSITION TO PO PAIN MED'S TODAY. SAGASTUME WITH MODERATE CLEAR YELLOW URINE. PT WILL D/C WITH SAGASTUME. POC UPDATED,PT VERBALIZED UNDERSTANDING.

## 2018-09-07 NOTE — PLAN OF CARE
DR Urban Lopez NOTIFIED THAT PT FEELS FRUSTRATED THAT ORAL PAIN MEDS DON'T SEEM TO BE HELPING. PT WAS SUPPOSED TO D/C TODAY.  OK TO RESUME MORPHINE AND CANCELL D/C. POC UPDATED, PT VERBALIZED UNDERSTANDING.

## 2018-09-08 VITALS
BODY MASS INDEX: 31.58 KG/M2 | HEART RATE: 76 BPM | WEIGHT: 185 LBS | RESPIRATION RATE: 18 BRPM | HEIGHT: 64 IN | OXYGEN SATURATION: 97 % | SYSTOLIC BLOOD PRESSURE: 148 MMHG | TEMPERATURE: 98 F | DIASTOLIC BLOOD PRESSURE: 77 MMHG

## 2018-09-08 PROCEDURE — S0028 INJECTION, FAMOTIDINE, 20 MG: HCPCS | Performed by: OBSTETRICS & GYNECOLOGY

## 2018-09-08 RX ORDER — HYDROCODONE BITARTRATE AND ACETAMINOPHEN 5; 325 MG/1; MG/1
1 TABLET ORAL EVERY 4 HOURS PRN
Status: DISCONTINUED | OUTPATIENT
Start: 2018-09-08 | End: 2018-09-08

## 2018-09-08 NOTE — PROGRESS NOTES
Feels much better today     Pain controlled, tolerating general diet  Abd soft, NT  urine clear in Hoang    POD 2 - doing well    PLAN -  Hoang/leg bag teaching  Home today  F/U 1 week for catheter removal  D/C instructions reviewed

## 2018-09-08 NOTE — PLAN OF CARE
Discharge to home or other facility with appropriate resources Progressing      Verbalizes/displays adequate comfort level or patient's stated pain goal Progressing      Absence of fever/infection during anticipated neutropenic period Progressing      Free

## 2018-09-08 NOTE — DISCHARGE SUMMARY
[facility]  Discharge Summary    Cristopher Esparza Patient Status:  Observation    10/24/1975 MRN [id number]   [facility] 3NW-A Attending No att. providers found   [facility] Day # 0 PCP Jeff Roe MD     Date of Admission: 2018 Dispo 1,000 mg by mouth one time.  , Historical    Multiple Vitamins-Minerals (MULTI COMPLETE/IRON) Oral Tab  Take by mouth daily.   , Historical    Loratadine-Pseudoephedrine (CLARITIN-D 12 HOUR OR)  Take by mouth as needed.  , Historical          Follow up Visi

## 2018-09-09 ENCOUNTER — TELEPHONE (OUTPATIENT)
Dept: UROLOGY | Facility: HOSPITAL | Age: 43
End: 2018-09-09

## 2018-09-09 NOTE — TELEPHONE ENCOUNTER
TC to pt following surgery  Doing well, no complaints  Cath draining clear urine.  +BM  Pain controlled with PO meds (IBP & norco)  Reviewed bowel mgmt and activity restrictions  Tolerates ambulation  raymundo PO, low appetite  No heavy vaginal bleeding, scant s

## 2018-09-13 ENCOUNTER — OFFICE VISIT (OUTPATIENT)
Dept: UROLOGY | Facility: HOSPITAL | Age: 43
End: 2018-09-13
Attending: OBSTETRICS & GYNECOLOGY
Payer: COMMERCIAL

## 2018-09-13 VITALS
WEIGHT: 185 LBS | DIASTOLIC BLOOD PRESSURE: 78 MMHG | SYSTOLIC BLOOD PRESSURE: 118 MMHG | BODY MASS INDEX: 31.58 KG/M2 | HEIGHT: 64 IN

## 2018-09-13 DIAGNOSIS — Z98.890 POST-OPERATIVE STATE: ICD-10-CM

## 2018-09-13 DIAGNOSIS — N99.89 POSTOPERATIVE URINARY RETENTION: Primary | ICD-10-CM

## 2018-09-13 DIAGNOSIS — R33.8 POSTOPERATIVE URINARY RETENTION: Primary | ICD-10-CM

## 2018-09-13 PROCEDURE — 99212 OFFICE O/P EST SF 10 MIN: CPT

## 2018-09-13 NOTE — PROCEDURES
.Patient here for voiding trial.  Pt having regular BMs. Taking Motrin only. Hoang catheter drained and then using a 60 cc Bette syringe, 200ml sterile water was instilled per gravity, balloon deflated using 10 cc syringe, and catheter removed.   Pt up to

## 2018-09-15 ENCOUNTER — TELEPHONE (OUTPATIENT)
Dept: UROLOGY | Facility: HOSPITAL | Age: 43
End: 2018-09-15

## 2018-09-15 RX ORDER — SULFAMETHOXAZOLE AND TRIMETHOPRIM 800; 160 MG/1; MG/1
1 TABLET ORAL 2 TIMES DAILY
Qty: 14 TABLET | Refills: 0 | Status: SHIPPED | OUTPATIENT
Start: 2018-09-15 | End: 2018-09-22

## 2018-09-15 NOTE — TELEPHONE ENCOUNTER
Pt c/o s/sx of UTI  Voiding well after cath removal  Otherwise well  No fevers/chills  Bactrim DS BID x7d #14  Pyridium prn  Encouraged PO hydration   Bowels regular  Encouraged her to call with further questions or concerns  She understands and agrees to

## 2018-09-18 ENCOUNTER — OFFICE VISIT (OUTPATIENT)
Dept: UROLOGY | Facility: HOSPITAL | Age: 43
End: 2018-09-18
Attending: OBSTETRICS & GYNECOLOGY
Payer: COMMERCIAL

## 2018-09-18 VITALS
BODY MASS INDEX: 31.58 KG/M2 | DIASTOLIC BLOOD PRESSURE: 78 MMHG | HEIGHT: 64 IN | SYSTOLIC BLOOD PRESSURE: 122 MMHG | WEIGHT: 185 LBS

## 2018-09-18 DIAGNOSIS — Z98.890 POST-OPERATIVE STATE: Primary | ICD-10-CM

## 2018-09-18 PROCEDURE — 99211 OFF/OP EST MAY X REQ PHY/QHP: CPT

## 2018-09-18 RX ORDER — POLYETHYLENE GLYCOL 3350 17 G/17G
17 POWDER, FOR SOLUTION ORAL DAILY
COMMUNITY
End: 2020-06-11 | Stop reason: ALTCHOICE

## 2018-09-18 NOTE — PROGRESS NOTES
She is s/p Post-Op Summary  Procedure Date: 09/06/18  Procedure Name: Robotic Assisted Total Hysterectomy;Bilateral Salpingectomy;Uterosacral Ligament Suspension; Anterior/Posterior/Enterocele Repair;Cystoscopy;Prolene Mesh Mid Urethral Sling; Other (Please

## 2018-10-10 ENCOUNTER — TELEPHONE (OUTPATIENT)
Dept: UROLOGY | Facility: HOSPITAL | Age: 43
End: 2018-10-10

## 2018-10-10 NOTE — TELEPHONE ENCOUNTER
9/6/18 ROBOTIC ASSISTED LAPAROSCOPIC HYSTERECTOMY, BILATERAL SALPINGECTOMY, VAGINAL UTEROSACRAL VAGINAL VAULT SUSPENSION, ANTERIOR/ POSTERIOR/ENTEROCELE REPAIR, PLACEMENT OF MID URETHRAL SLING, CYSTOSCOPY , LABIAL REVISION  Patient having Labial pain from

## 2018-10-11 ENCOUNTER — NURSE ONLY (OUTPATIENT)
Dept: UROLOGY | Facility: HOSPITAL | Age: 43
End: 2018-10-11
Attending: OBSTETRICS & GYNECOLOGY
Payer: COMMERCIAL

## 2018-10-11 VITALS — DIASTOLIC BLOOD PRESSURE: 72 MMHG | TEMPERATURE: 98 F | SYSTOLIC BLOOD PRESSURE: 132 MMHG

## 2018-10-11 DIAGNOSIS — Z98.890 POST-OPERATIVE STATE: Primary | ICD-10-CM

## 2018-10-11 PROCEDURE — 99211 OFF/OP EST MAY X REQ PHY/QHP: CPT

## 2018-10-11 NOTE — PROGRESS NOTES
Patient reports she returned to work on Monday 10/8/18 - was on feet most of day - Since then experienced right labial sensation described as uncomfortable - \" area felt different\" - she looked in mirror and noticed separation in right labial tissue -no

## 2018-10-15 NOTE — PROGRESS NOTES
Pt c/o labial irritation, bleeding, and pain. Back to work    Gen NAD  CV RRR   Pulm nl effort  : right labial incision defect at prior closure, 8mm hole defect in previously repaired labia minora. Thin labial connection.     D/w pt options for mgmt - ex

## 2019-01-29 ENCOUNTER — APPOINTMENT (OUTPATIENT)
Dept: OTHER | Facility: HOSPITAL | Age: 44
End: 2019-01-29
Attending: PREVENTIVE MEDICINE

## 2019-02-04 ENCOUNTER — OFFICE VISIT (OUTPATIENT)
Dept: UROLOGY | Facility: HOSPITAL | Age: 44
End: 2019-02-04
Attending: OBSTETRICS & GYNECOLOGY
Payer: COMMERCIAL

## 2019-02-04 VITALS
HEIGHT: 64 IN | BODY MASS INDEX: 31.58 KG/M2 | WEIGHT: 185 LBS | DIASTOLIC BLOOD PRESSURE: 84 MMHG | SYSTOLIC BLOOD PRESSURE: 120 MMHG

## 2019-02-04 DIAGNOSIS — Z98.890 POST-OPERATIVE STATE: Primary | ICD-10-CM

## 2019-02-04 DIAGNOSIS — N90.89 LABIAL LESION: ICD-10-CM

## 2019-02-04 DIAGNOSIS — N81.84 PELVIC MUSCLE WASTING: ICD-10-CM

## 2019-02-04 PROCEDURE — 12001 RPR S/N/AX/GEN/TRNK 2.5CM/<: CPT | Performed by: OBSTETRICS & GYNECOLOGY

## 2019-02-04 NOTE — PROGRESS NOTES
She is s/p TVH w/ repairs, MUS, cysto  Doing well  she complains of left labia separation w/ hole, irritating and painful with physical activity  Voids freely  BMs regular  No other Pain, denies dyspareunia  Tolerates ambulation & diet  No leakage  No bulg

## 2019-02-05 ENCOUNTER — TELEPHONE (OUTPATIENT)
Dept: UROLOGY | Facility: HOSPITAL | Age: 44
End: 2019-02-05

## 2019-02-05 RX ORDER — TRAMADOL HYDROCHLORIDE 50 MG/1
50 TABLET ORAL EVERY 6 HOURS PRN
Qty: 12 TABLET | Refills: 0 | Status: SHIPPED
Start: 2019-02-05 | End: 2020-06-11 | Stop reason: ALTCHOICE

## 2019-02-05 NOTE — TELEPHONE ENCOUNTER
Pt s/p labial revision yesterday  Denies heavy bleeding  Reports pain, reviewed PO pain mgmt - will add tramadol  IBP q 6 hr  Ice to perineum prn  Will follow

## 2020-06-11 ENCOUNTER — OFFICE VISIT (OUTPATIENT)
Dept: FAMILY MEDICINE CLINIC | Facility: CLINIC | Age: 45
End: 2020-06-11
Payer: COMMERCIAL

## 2020-06-11 VITALS
TEMPERATURE: 97 F | HEIGHT: 64 IN | WEIGHT: 185.38 LBS | HEART RATE: 115 BPM | RESPIRATION RATE: 12 BRPM | DIASTOLIC BLOOD PRESSURE: 80 MMHG | SYSTOLIC BLOOD PRESSURE: 138 MMHG | BODY MASS INDEX: 31.65 KG/M2 | OXYGEN SATURATION: 99 %

## 2020-06-11 DIAGNOSIS — T23.172A: Primary | ICD-10-CM

## 2020-06-11 DIAGNOSIS — T23.102A: Primary | ICD-10-CM

## 2020-06-11 PROCEDURE — 99213 OFFICE O/P EST LOW 20 MIN: CPT | Performed by: NURSE PRACTITIONER
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
## 2020-06-18 ENCOUNTER — OFFICE VISIT (OUTPATIENT)
Dept: FAMILY MEDICINE CLINIC | Facility: CLINIC | Age: 45
End: 2020-06-18
Payer: COMMERCIAL

## 2020-06-18 VITALS
SYSTOLIC BLOOD PRESSURE: 144 MMHG | OXYGEN SATURATION: 98 % | TEMPERATURE: 98 F | DIASTOLIC BLOOD PRESSURE: 90 MMHG | WEIGHT: 186 LBS | BODY MASS INDEX: 32 KG/M2 | HEART RATE: 110 BPM

## 2020-06-18 DIAGNOSIS — T23.172A: Primary | ICD-10-CM

## 2020-06-18 DIAGNOSIS — T23.102A: Primary | ICD-10-CM

## 2020-06-18 PROCEDURE — 99213 OFFICE O/P EST LOW 20 MIN: CPT | Performed by: INTERNAL MEDICINE
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
## 2020-06-25 DIAGNOSIS — Z78.9 PARTICIPANT IN HEALTH AND WELLNESS PLAN: Primary | ICD-10-CM

## 2020-09-09 ENCOUNTER — OFFICE VISIT (OUTPATIENT)
Dept: FAMILY MEDICINE CLINIC | Facility: CLINIC | Age: 45
End: 2020-09-09
Payer: COMMERCIAL

## 2020-09-09 ENCOUNTER — HOSPITAL ENCOUNTER (OUTPATIENT)
Dept: MAMMOGRAPHY | Age: 45
Discharge: HOME | End: 2020-09-09
Attending: INTERNAL MEDICINE
Payer: COMMERCIAL

## 2020-09-09 ENCOUNTER — LABORATORY ENCOUNTER (OUTPATIENT)
Dept: LAB | Age: 45
End: 2020-09-09
Attending: INTERNAL MEDICINE
Payer: COMMERCIAL

## 2020-09-09 VITALS
BODY MASS INDEX: 30.66 KG/M2 | WEIGHT: 184 LBS | SYSTOLIC BLOOD PRESSURE: 122 MMHG | HEIGHT: 64.95 IN | HEART RATE: 78 BPM | DIASTOLIC BLOOD PRESSURE: 70 MMHG | RESPIRATION RATE: 16 BRPM | TEMPERATURE: 98 F | OXYGEN SATURATION: 96 %

## 2020-09-09 DIAGNOSIS — N95.1 HOT FLASHES DUE TO MENOPAUSE: ICD-10-CM

## 2020-09-09 DIAGNOSIS — Z00.00 WELL ADULT EXAM: ICD-10-CM

## 2020-09-09 DIAGNOSIS — Z00.00 WELL ADULT EXAM: Primary | ICD-10-CM

## 2020-09-09 LAB
ALBUMIN SERPL-MCNC: 3.8 G/DL (ref 3.4–5)
ALBUMIN/GLOB SERPL: 1.2 {RATIO} (ref 1–2)
ALP LIVER SERPL-CCNC: 65 U/L (ref 37–98)
ALT SERPL-CCNC: 15 U/L (ref 13–56)
ANION GAP SERPL CALC-SCNC: 3 MMOL/L (ref 0–18)
AST SERPL-CCNC: 14 U/L (ref 15–37)
BASOPHILS # BLD AUTO: 0.03 X10(3) UL (ref 0–0.2)
BASOPHILS NFR BLD AUTO: 0.4 %
BILIRUB SERPL-MCNC: 0.4 MG/DL (ref 0.1–2)
BUN BLD-MCNC: 7 MG/DL (ref 7–18)
BUN/CREAT SERPL: 10.9 (ref 10–20)
CALCIUM BLD-MCNC: 8.8 MG/DL (ref 8.5–10.1)
CHLORIDE SERPL-SCNC: 105 MMOL/L (ref 98–112)
CHOLEST SMN-MCNC: 152 MG/DL (ref ?–200)
CO2 SERPL-SCNC: 30 MMOL/L (ref 21–32)
CREAT BLD-MCNC: 0.64 MG/DL (ref 0.55–1.02)
DEPRECATED RDW RBC AUTO: 39.9 FL (ref 35.1–46.3)
EOSINOPHIL # BLD AUTO: 0.11 X10(3) UL (ref 0–0.7)
EOSINOPHIL NFR BLD AUTO: 1.6 %
ERYTHROCYTE [DISTWIDTH] IN BLOOD BY AUTOMATED COUNT: 13.1 % (ref 11–15)
GLOBULIN PLAS-MCNC: 3.3 G/DL (ref 2.8–4.4)
GLUCOSE BLD-MCNC: 94 MG/DL (ref 70–99)
HCT VFR BLD AUTO: 41 % (ref 35–48)
HDLC SERPL-MCNC: 48 MG/DL (ref 40–59)
HGB BLD-MCNC: 14.1 G/DL (ref 12–16)
IMM GRANULOCYTES # BLD AUTO: 0.01 X10(3) UL (ref 0–1)
IMM GRANULOCYTES NFR BLD: 0.1 %
LDLC SERPL CALC-MCNC: 76 MG/DL (ref ?–100)
LYMPHOCYTES # BLD AUTO: 1.85 X10(3) UL (ref 1–4)
LYMPHOCYTES NFR BLD AUTO: 26.7 %
M PROTEIN MFR SERPL ELPH: 7.1 G/DL (ref 6.4–8.2)
MCH RBC QN AUTO: 29.3 PG (ref 26–34)
MCHC RBC AUTO-ENTMCNC: 34.4 G/DL (ref 31–37)
MCV RBC AUTO: 85.2 FL (ref 80–100)
MONOCYTES # BLD AUTO: 0.64 X10(3) UL (ref 0.1–1)
MONOCYTES NFR BLD AUTO: 9.2 %
NEUTROPHILS # BLD AUTO: 4.3 X10 (3) UL (ref 1.5–7.7)
NEUTROPHILS # BLD AUTO: 4.3 X10(3) UL (ref 1.5–7.7)
NEUTROPHILS NFR BLD AUTO: 62 %
NONHDLC SERPL-MCNC: 104 MG/DL (ref ?–130)
OSMOLALITY SERPL CALC.SUM OF ELEC: 284 MOSM/KG (ref 275–295)
PATIENT FASTING Y/N/NP: YES
PATIENT FASTING Y/N/NP: YES
PLATELET # BLD AUTO: 277 10(3)UL (ref 150–450)
POTASSIUM SERPL-SCNC: 4.1 MMOL/L (ref 3.5–5.1)
RBC # BLD AUTO: 4.81 X10(6)UL (ref 3.8–5.3)
SODIUM SERPL-SCNC: 138 MMOL/L (ref 136–145)
T4 FREE SERPL-MCNC: 1 NG/DL (ref 0.8–1.7)
TRIGL SERPL-MCNC: 142 MG/DL (ref 30–149)
TSI SER-ACNC: 1.2 MIU/ML (ref 0.36–3.74)
VLDLC SERPL CALC-MCNC: 28 MG/DL (ref 0–30)
WBC # BLD AUTO: 6.9 X10(3) UL (ref 4–11)

## 2020-09-09 PROCEDURE — 84439 ASSAY OF FREE THYROXINE: CPT | Performed by: INTERNAL MEDICINE

## 2020-09-09 PROCEDURE — 3008F BODY MASS INDEX DOCD: CPT | Performed by: INTERNAL MEDICINE

## 2020-09-09 PROCEDURE — 77067 SCR MAMMO BI INCL CAD: CPT | Performed by: INTERNAL MEDICINE

## 2020-09-09 PROCEDURE — 99396 PREV VISIT EST AGE 40-64: CPT | Performed by: INTERNAL MEDICINE

## 2020-09-09 PROCEDURE — 80050 GENERAL HEALTH PANEL: CPT | Performed by: INTERNAL MEDICINE

## 2020-09-09 PROCEDURE — 36415 COLL VENOUS BLD VENIPUNCTURE: CPT | Performed by: INTERNAL MEDICINE

## 2020-09-09 PROCEDURE — 80061 LIPID PANEL: CPT | Performed by: INTERNAL MEDICINE

## 2020-09-09 RX ORDER — VENLAFAXINE HYDROCHLORIDE 37.5 MG/1
37.5 CAPSULE, EXTENDED RELEASE ORAL DAILY
Qty: 90 CAPSULE | Refills: 0 | Status: SHIPPED | OUTPATIENT
Start: 2020-09-09 | End: 2020-12-02

## 2020-09-09 NOTE — PROGRESS NOTES
HPI:   Cinthia Hollis is a 40year old female who presents for a complete physical exam. Symptoms: denies discharge, itching, burning or dysuria, is S/P ERWIN, ovaries preserved. Patient complains of emotional liability.   Increased irritability and crying Procedure Laterality Date   • ANTERIOR POSTERIOR REPAIR N/A 9/6/2018    Performed by Felicita Cobian DO at [facility] MAIN OR   • ANTERIOR REPAIR     • ENTEROCELE REPAIR     • FEMUR/KNEE SURG UNLISTED Left 01/01/2006    knee surgery, arthroscopy    • HYSTERECT asthma    EXAM:   /70   Pulse 78   Temp 98.2 °F (36.8 °C) (Temporal)   Resp 16   Ht 64.95\"   Wt 184 lb (83.5 kg)   LMP 08/20/2018   SpO2 96%   BMI 30.67 kg/m²   Body mass index is 30.67 kg/m².    GENERAL: well developed, well nourished,in no apparent

## 2020-09-16 ENCOUNTER — HOSPITAL ENCOUNTER (OUTPATIENT)
Dept: MAMMOGRAPHY | Facility: HOSPITAL | Age: 45
Discharge: HOME | End: 2020-09-16
Attending: INTERNAL MEDICINE
Payer: COMMERCIAL

## 2020-09-16 DIAGNOSIS — R92.8 ABNORMAL MAMMOGRAM: ICD-10-CM

## 2020-09-16 PROCEDURE — 77062 BREAST TOMOSYNTHESIS BI: CPT | Performed by: INTERNAL MEDICINE

## 2020-09-16 PROCEDURE — 77066 DX MAMMO INCL CAD BI: CPT | Performed by: INTERNAL MEDICINE

## 2020-09-16 PROCEDURE — 76642 ULTRASOUND BREAST LIMITED: CPT | Performed by: INTERNAL MEDICINE

## 2020-12-02 RX ORDER — VENLAFAXINE HYDROCHLORIDE 37.5 MG/1
37.5 CAPSULE, EXTENDED RELEASE ORAL DAILY
Qty: 90 CAPSULE | Refills: 0 | Status: SHIPPED | OUTPATIENT
Start: 2020-12-02 | End: 2021-08-02

## 2020-12-02 NOTE — TELEPHONE ENCOUNTER
Last office visit: 9/9/20  Last refill: 9/9/20  No future appointments.    Name from pharmacy: Venlafaxine Hcl Er 24hr 37.5 Mg Cap Sandra         Will file in chart as: VENLAFAXINE HCL ER 37.5 MG Oral Capsule SR 24 Hr    Sig: Take 1 capsule (37.5 mg total) by

## 2021-01-04 ENCOUNTER — IMMUNIZATION (OUTPATIENT)
Dept: LAB | Facility: HOSPITAL | Age: 46
End: 2021-01-04
Payer: COMMERCIAL

## 2021-01-04 DIAGNOSIS — Z23 NEED FOR VACCINATION: ICD-10-CM

## 2021-01-04 PROCEDURE — 0011A SARSCOV2 VAC 100MCG/0.5ML IM: CPT

## 2021-01-27 ENCOUNTER — PATIENT MESSAGE (OUTPATIENT)
Dept: FAMILY MEDICINE CLINIC | Facility: CLINIC | Age: 46
End: 2021-01-27

## 2021-01-27 RX ORDER — VENLAFAXINE HYDROCHLORIDE 37.5 MG/1
37.5 CAPSULE, EXTENDED RELEASE ORAL DAILY
Qty: 90 CAPSULE | Refills: 0 | OUTPATIENT
Start: 2021-01-27 | End: 2021-04-27

## 2021-01-27 RX ORDER — VENLAFAXINE HYDROCHLORIDE 37.5 MG/1
37.5 CAPSULE, EXTENDED RELEASE ORAL DAILY
Qty: 90 CAPSULE | Refills: 3 | Status: SHIPPED | OUTPATIENT
Start: 2021-01-27 | End: 2022-01-13

## 2021-01-27 NOTE — TELEPHONE ENCOUNTER
Last refill #90 on 12/3/220 - patient should have enough medication until on or around 3/3/2021  Refill denied.

## 2021-01-27 NOTE — TELEPHONE ENCOUNTER
From: Gaurav Huerta  To: Shira Piper MD  Sent: 1/27/2021 10:11 AM CST  Subject: Prescription Question    Hi,   I tried to get my Effexor script transferred to [facility] because it’s more cost effective. But it was denied.  I’m not sure why it wa

## 2021-02-01 ENCOUNTER — IMMUNIZATION (OUTPATIENT)
Dept: LAB | Facility: HOSPITAL | Age: 46
End: 2021-02-01
Attending: PREVENTIVE MEDICINE
Payer: COMMERCIAL

## 2021-02-01 DIAGNOSIS — Z23 NEED FOR VACCINATION: Primary | ICD-10-CM

## 2021-02-01 PROCEDURE — 0012A SARSCOV2 VAC 100MCG/0.5ML IM: CPT

## 2021-02-15 ENCOUNTER — TELEPHONE (OUTPATIENT)
Dept: UROLOGY | Facility: HOSPITAL | Age: 46
End: 2021-02-15

## 2021-02-15 RX ORDER — NITROFURANTOIN 25; 75 MG/1; MG/1
100 CAPSULE ORAL 2 TIMES DAILY
Qty: 14 CAPSULE | Refills: 0 | Status: SHIPPED | OUTPATIENT
Start: 2021-02-15 | End: 2021-02-22

## 2021-02-15 NOTE — TELEPHONE ENCOUNTER
TC from pt with c/o s/sx of UTI  Urinary urgency/freqency with dysuria    No fevers/chills  Reviewed allergies  Empiric NTF 100mg po bid x7d sent to pharm  OTC AZO  Will need ucx if sx persist  if recurrent may need additional mgmt options    Advised PO hy

## 2021-03-03 ENCOUNTER — OFFICE VISIT (OUTPATIENT)
Dept: FAMILY MEDICINE CLINIC | Facility: CLINIC | Age: 46
End: 2021-03-03
Payer: COMMERCIAL

## 2021-03-03 VITALS
OXYGEN SATURATION: 98 % | HEIGHT: 65 IN | DIASTOLIC BLOOD PRESSURE: 80 MMHG | BODY MASS INDEX: 30.99 KG/M2 | TEMPERATURE: 98 F | WEIGHT: 186 LBS | SYSTOLIC BLOOD PRESSURE: 126 MMHG | RESPIRATION RATE: 16 BRPM | HEART RATE: 112 BPM

## 2021-03-03 DIAGNOSIS — G43.009 MIGRAINE WITHOUT AURA AND WITHOUT STATUS MIGRAINOSUS, NOT INTRACTABLE: Primary | ICD-10-CM

## 2021-03-03 PROBLEM — G43.109 MIGRAINE WITH AURA AND WITHOUT STATUS MIGRAINOSUS, NOT INTRACTABLE: Status: ACTIVE | Noted: 2021-03-03

## 2021-03-03 PROCEDURE — 3008F BODY MASS INDEX DOCD: CPT | Performed by: INTERNAL MEDICINE

## 2021-03-03 PROCEDURE — 99214 OFFICE O/P EST MOD 30 MIN: CPT | Performed by: INTERNAL MEDICINE

## 2021-03-03 PROCEDURE — 3079F DIAST BP 80-89 MM HG: CPT | Performed by: INTERNAL MEDICINE

## 2021-03-03 PROCEDURE — 3074F SYST BP LT 130 MM HG: CPT | Performed by: INTERNAL MEDICINE

## 2021-03-03 RX ORDER — SUMATRIPTAN 100 MG/1
100 TABLET, FILM COATED ORAL EVERY 2 HOUR PRN
Qty: 9 TABLET | Refills: 1 | Status: SHIPPED | OUTPATIENT
Start: 2021-03-03 | End: 2021-05-11

## 2021-03-03 NOTE — PROGRESS NOTES
Alexandra Barlow is a 39year old female. HPI:   Pt had  COVID vaccine and ever since suffered with HA. She had not had these prior to her immunization. SHe had to miss consecutive days and still intermittently has HA that require treatment.  She has had 112   Temp 97.6 °F (36.4 °C) (Temporal)   Resp 16   Ht 5' 5\" (1.651 m)   Wt 186 lb (84.4 kg)   LMP 08/20/2018   SpO2 98%   BMI 30.95 kg/m²   GENERAL: well developed, well nourished,in no apparent distress  SKIN: no rashes,no suspicious lesions  HEENT: atr

## 2021-03-05 ENCOUNTER — PATIENT MESSAGE (OUTPATIENT)
Dept: FAMILY MEDICINE CLINIC | Facility: CLINIC | Age: 46
End: 2021-03-05

## 2021-03-05 NOTE — TELEPHONE ENCOUNTER
From: Tiana Lazo  To: Zeenat Sims MD  Sent: 3/5/2021 11:27 AM CST  Subject: Non-Urgent Medical Question    Hi,   They said my FMLA was incomplete. Where can I email the letter so it can get corrected.   Thanks , Ludwig Urena

## 2021-03-11 ENCOUNTER — TELEPHONE (OUTPATIENT)
Dept: FAMILY MEDICINE CLINIC | Facility: CLINIC | Age: 46
End: 2021-03-11

## 2021-03-11 NOTE — TELEPHONE ENCOUNTER
Patient said that her employer never go the paperwork. Form re-faxed to [phone] Leave #[id number].

## 2021-03-12 ENCOUNTER — PATIENT MESSAGE (OUTPATIENT)
Dept: FAMILY MEDICINE CLINIC | Facility: CLINIC | Age: 46
End: 2021-03-12

## 2021-03-12 NOTE — TELEPHONE ENCOUNTER
From: Jey Delgadillo  To: Fidelina Almaguer MD  Sent: 3/12/2021 9:10 AM CST  Subject: Non-Urgent Medical Question    Hi,   I have attached a picture of the changes that need to be made pls. This is the last chance I get to amend this paperwork.  Please if you

## 2021-03-12 NOTE — TELEPHONE ENCOUNTER
Lydiae should be calling, she can tell you where the original forms are and they can be addended by nurse and initialed with TG

## 2021-03-13 NOTE — TELEPHONE ENCOUNTER
From: Luis Daniel Sanchez  To: Sonal Lugo MD  Sent: 3/12/2021 5:48 PM CST  Subject: Non-Urgent Medical Question    Hi,   They denied it again for non qualified event. Thanks for faxing & refaxing.    Thanks, Aurelio

## 2021-03-18 ENCOUNTER — MED REC SCAN ONLY (OUTPATIENT)
Dept: FAMILY MEDICINE CLINIC | Facility: CLINIC | Age: 46
End: 2021-03-18

## 2021-04-06 DIAGNOSIS — N64.52 DISCHARGE FROM RIGHT NIPPLE: Primary | ICD-10-CM

## 2021-04-09 ENCOUNTER — OFFICE VISIT (OUTPATIENT)
Dept: SURGERY | Facility: CLINIC | Age: 46
End: 2021-04-09
Payer: COMMERCIAL

## 2021-04-09 VITALS
HEART RATE: 90 BPM | RESPIRATION RATE: 16 BRPM | DIASTOLIC BLOOD PRESSURE: 90 MMHG | OXYGEN SATURATION: 95 % | HEIGHT: 64.5 IN | WEIGHT: 186 LBS | SYSTOLIC BLOOD PRESSURE: 147 MMHG | BODY MASS INDEX: 31.37 KG/M2

## 2021-04-09 DIAGNOSIS — N64.52 NIPPLE DISCHARGE: Primary | ICD-10-CM

## 2021-04-09 DIAGNOSIS — Z80.3 FAMILY HISTORY OF BREAST CANCER: ICD-10-CM

## 2021-04-09 NOTE — PROGRESS NOTES
Breast Surgery New Patient Consultation    This is the first visit for this 39year old woman, referred by Dr. Larry Kerns, who presents for evaluation of nipple concerns.     History of Present Illness:   Ms. Tiana Lazo is a 39year old woman who presents w MAIN OR   • MIDURETHRAL SLING     • OTHER SURGICAL HISTORY  1/1/1991    nose surgery, nose fx   • OTHER SURGICAL HISTORY Right     knee arthroscopy x 2   • OTHER SURGICAL HISTORY      hysteroscopy   • POSTERIOR REPAIR     • ROBOT-ASSISTED LAPAROSCOPIC HYST Former Smoker   She is  and has 3 sons. Review of Systems:  General:   The patient denies, fever, chills, night sweats, fatigue, generalized weakness, change in appetite or weight loss.     HEENT:     The patient denies eye irritation, cataracts, pain.    Neuropsychiatric:  There is no history of +migraines or severe headaches, seizure/epilepsy, speech problems, coordination problems, trembling/tremors, fainting/black outs, dizziness, memory problems, loss of sensation/numbness, problems walking, w dimpling with movement of the pectoralis. There is no nipple retraction. No nipple discharge can be elicited. The parenchyma is mildly nodular. There are no dominant masses in the breast. The axillary tail is normal.    Abdomen:   The abdomen is soft, flat plan will be dictated accordingly. She was given ample opportunity for questions and those questions were answered to her satisfaction. She has been  encouraged to contact the office with any questions or concerns prior to her next appointment.

## 2021-04-12 ENCOUNTER — HOSPITAL ENCOUNTER (OUTPATIENT)
Dept: MRI IMAGING | Facility: HOSPITAL | Age: 46
Discharge: HOME | End: 2021-04-12
Attending: SURGERY
Payer: COMMERCIAL

## 2021-04-12 DIAGNOSIS — N64.52 DISCHARGE FROM RIGHT NIPPLE: ICD-10-CM

## 2021-04-12 PROCEDURE — 77049 MRI BREAST C-+ W/CAD BI: CPT | Performed by: SURGERY

## 2021-04-12 PROCEDURE — A9575 INJ GADOTERATE MEGLUMI 0.1ML: HCPCS | Performed by: SURGERY

## 2021-05-04 ENCOUNTER — PATIENT MESSAGE (OUTPATIENT)
Dept: FAMILY MEDICINE CLINIC | Facility: CLINIC | Age: 46
End: 2021-05-04

## 2021-05-04 DIAGNOSIS — Z20.822 COVID-19 RULED OUT: Primary | ICD-10-CM

## 2021-05-04 NOTE — TELEPHONE ENCOUNTER
From: Chata Mata  To: Jeff Roe MD  Sent: 5/4/2021 2:54 PM CDT  Subject: Non-Urgent Medical Question    Hi,   I am going to [place] for a medical mission 5/13. I need to get a COVID test next Monday or Tuesday.  Can you order one or am I better off

## 2021-05-11 RX ORDER — SUMATRIPTAN 100 MG/1
100 TABLET, FILM COATED ORAL EVERY 2 HOUR PRN
Qty: 9 TABLET | Refills: 1 | Status: SHIPPED | OUTPATIENT
Start: 2021-05-11 | End: 2021-08-24

## 2021-05-11 NOTE — TELEPHONE ENCOUNTER
Last refill: 03/03/21  Qty: 9 tabs  W/  1 refills  Last ov: 03/03/21    Requested Prescriptions     Pending Prescriptions Disp Refills   • SUMAtriptan Succinate (IMITREX) 100 MG Oral Tab 9 tablet 1     Sig: Take 1 tablet (100 mg total) by mouth every 2 (tw

## 2021-07-05 ENCOUNTER — PATIENT MESSAGE (OUTPATIENT)
Dept: FAMILY MEDICINE CLINIC | Facility: CLINIC | Age: 46
End: 2021-07-05

## 2021-07-06 NOTE — TELEPHONE ENCOUNTER
From: Wily Sanchez  To: Shelly Anthony MD  Sent: 7/5/2021 7:15 PM CDT  Subject: Non-Urgent Medical Question    Hi,   I am waiting for my Effexor to come I the mail. I am day 2 without it & am starting to have withdrawal symptoms.  Is there anything I can

## 2021-07-31 NOTE — TELEPHONE ENCOUNTER
LOV 03/03/2021      LAST RX 01/27/2021 90 capsules 3 refills     Next OV   Future Appointments   Date Time Provider Roxy Vasquez   8/11/2021  7:45 AM Vick Gamino MD [facility]         PROTOCOL none

## 2021-08-02 RX ORDER — VENLAFAXINE HYDROCHLORIDE 37.5 MG/1
37.5 CAPSULE, EXTENDED RELEASE ORAL DAILY
Qty: 90 CAPSULE | Refills: 0 | Status: SHIPPED | OUTPATIENT
Start: 2021-08-02 | End: 2021-09-15

## 2021-08-23 NOTE — TELEPHONE ENCOUNTER
LOV   03/03/2021    LAST LAB  09/09/2020     LAST RX  05/11/2021    Next OV    Future Appointments   Date Time Provider Roxy Vasquez   2/9/2022  7:30 AM Roxana Zuniga MD [facility]         PROTOCOL  NO PROTOCOL

## 2021-08-24 RX ORDER — SUMATRIPTAN 100 MG/1
100 TABLET, FILM COATED ORAL EVERY 2 HOUR PRN
Qty: 9 TABLET | Refills: 1 | Status: SHIPPED | OUTPATIENT
Start: 2021-08-24 | End: 2021-08-26

## 2021-08-26 RX ORDER — SUMATRIPTAN 100 MG/1
100 TABLET, FILM COATED ORAL EVERY 2 HOUR PRN
Qty: 9 TABLET | Refills: 1 | Status: SHIPPED | OUTPATIENT
Start: 2021-08-26

## 2021-09-14 ENCOUNTER — PATIENT MESSAGE (OUTPATIENT)
Dept: FAMILY MEDICINE CLINIC | Facility: CLINIC | Age: 46
End: 2021-09-14

## 2021-09-14 DIAGNOSIS — Z12.11 COLON CANCER SCREENING: Primary | ICD-10-CM

## 2021-09-15 ENCOUNTER — PATIENT MESSAGE (OUTPATIENT)
Dept: FAMILY MEDICINE CLINIC | Facility: CLINIC | Age: 46
End: 2021-09-15

## 2021-09-15 RX ORDER — VENLAFAXINE HYDROCHLORIDE 37.5 MG/1
37.5 CAPSULE, EXTENDED RELEASE ORAL DAILY
Qty: 90 CAPSULE | Refills: 0 | Status: SHIPPED | OUTPATIENT
Start: 2021-09-15 | End: 2021-12-14

## 2021-09-15 NOTE — TELEPHONE ENCOUNTER
From: Marilu Mathews  To: Chance Peguero MD  Sent: 9/14/2021 9:38 AM CDT  Subject: GI     Hi, who do you recommend for a colonoscopy?

## 2021-09-15 NOTE — TELEPHONE ENCOUNTER
LOV: 3-3-2021    LAST LAB:9-9-2020    LAST RX:    Medication Quantity Refills Start End   VENLAFAXINE 37.5 MG Oral Capsule SR 24 Hr 90 capsule 0 8/2/2021 10/31/2021   Sig:   Take 1 capsule (37.5 mg total) by mouth daily.      Route:   Oral         Next OV:

## 2021-09-15 NOTE — TELEPHONE ENCOUNTER
From: [facility]  Sent: 9/15/2021 5:01 PM CDT  To: Jorge Servin Clinical Staff  Subject: GI     Thanks

## 2021-10-06 ENCOUNTER — OFFICE VISIT (OUTPATIENT)
Dept: FAMILY MEDICINE CLINIC | Facility: CLINIC | Age: 46
End: 2021-10-06
Payer: COMMERCIAL

## 2021-10-06 VITALS
WEIGHT: 187.5 LBS | BODY MASS INDEX: 31.62 KG/M2 | HEIGHT: 64.5 IN | OXYGEN SATURATION: 98 % | SYSTOLIC BLOOD PRESSURE: 132 MMHG | RESPIRATION RATE: 18 BRPM | TEMPERATURE: 98 F | HEART RATE: 81 BPM | DIASTOLIC BLOOD PRESSURE: 78 MMHG

## 2021-10-06 DIAGNOSIS — Z00.00 WELL ADULT EXAM: ICD-10-CM

## 2021-10-06 DIAGNOSIS — Z23 NEED FOR VACCINATION: Primary | ICD-10-CM

## 2021-10-06 PROCEDURE — 90686 IIV4 VACC NO PRSV 0.5 ML IM: CPT | Performed by: INTERNAL MEDICINE

## 2021-10-06 PROCEDURE — 99396 PREV VISIT EST AGE 40-64: CPT | Performed by: INTERNAL MEDICINE

## 2021-10-06 PROCEDURE — 3008F BODY MASS INDEX DOCD: CPT | Performed by: INTERNAL MEDICINE

## 2021-10-06 PROCEDURE — 90471 IMMUNIZATION ADMIN: CPT | Performed by: INTERNAL MEDICINE

## 2021-10-06 PROCEDURE — 3075F SYST BP GE 130 - 139MM HG: CPT | Performed by: INTERNAL MEDICINE

## 2021-10-06 PROCEDURE — 3078F DIAST BP <80 MM HG: CPT | Performed by: INTERNAL MEDICINE

## 2021-10-06 RX ORDER — NALTREXONE HYDROCHLORIDE AND BUPROPION HYDROCHLORIDE 8; 90 MG/1; MG/1
TABLET, EXTENDED RELEASE ORAL
Qty: 70 TABLET | Refills: 0 | Status: SHIPPED | OUTPATIENT
Start: 2021-10-06 | End: 2021-11-05

## 2021-10-07 NOTE — PROGRESS NOTES
HPI:   Annette Coates is a 39year old female who presents for a complete physical exam. Symptoms: denies discharge, itching, burning or dysuria, is S/P ERWIN, ovaries preserved. Patient complains of inability to lose weight.        Immunization History  Adm 2 HRS-ONLY 2 IN 24 HR MAX 9 tablet 1   • ibuprofen 600 MG Oral Tab Take 1 tablet (600 mg total) by mouth every 6 (six) hours as needed. 30 tablet 3   • Multiple Vitamins-Minerals (MULTI COMPLETE/IRON) Oral Tab Take by mouth daily.        • Loratadine-Pseudo exertion  GI: denies abdominal pain,denies heartburn  : denies dysuria, vaginal discharge or itching,periods regular   MUSCULOSKELETAL: denies back pain  NEURO: denies headaches  PSYCHE: denies depression or anxiety  HEMATOLOGIC: denies hx of anemia  END

## 2021-11-17 ENCOUNTER — HOSPITAL ENCOUNTER (OUTPATIENT)
Dept: MAMMOGRAPHY | Facility: HOSPITAL | Age: 46
Discharge: HOME | End: 2021-11-17
Attending: INTERNAL MEDICINE
Payer: COMMERCIAL

## 2021-11-17 DIAGNOSIS — Z00.00 WELL ADULT EXAM: ICD-10-CM

## 2021-11-17 PROCEDURE — 77063 BREAST TOMOSYNTHESIS BI: CPT | Performed by: INTERNAL MEDICINE

## 2021-11-17 PROCEDURE — 77067 SCR MAMMO BI INCL CAD: CPT | Performed by: INTERNAL MEDICINE

## 2021-12-10 PROBLEM — L98.9 LESION OF SKIN OF FACE: Status: ACTIVE | Noted: 2017-12-12

## 2021-12-16 ENCOUNTER — PATIENT MESSAGE (OUTPATIENT)
Dept: FAMILY MEDICINE CLINIC | Facility: CLINIC | Age: 46
End: 2021-12-16

## 2021-12-16 RX ORDER — METHYLPREDNISOLONE 4 MG/1
TABLET ORAL
Qty: 1 EACH | Refills: 0 | Status: SHIPPED | OUTPATIENT
Start: 2021-12-16

## 2021-12-16 NOTE — TELEPHONE ENCOUNTER
From: Jennifer Angelo  To: Juan Benjamin MD  Sent: 12/16/2021 7:21 AM CST  Subject: Back pain    Hi,   I am having lower left back pain. Wakes me up at night. Twisting, walking, lifting up my legs. It is starting to make front of my thigh chris numb.  I am

## 2022-01-04 ENCOUNTER — PATIENT MESSAGE (OUTPATIENT)
Dept: FAMILY MEDICINE CLINIC | Facility: CLINIC | Age: 47
End: 2022-01-04

## 2022-01-04 NOTE — TELEPHONE ENCOUNTER
From: Javan Brito  To: Ari Jurado MD  Sent: 1/4/2022 11:13 AM CST  Subject: COVID pcr    Hi,   I am having symptoms of covid starting Sunday morning.  I have taken 2 rapid test ( both negative) last night started with a sore throat, this morning jie

## 2022-01-05 ENCOUNTER — OFFICE VISIT (OUTPATIENT)
Dept: FAMILY MEDICINE CLINIC | Facility: CLINIC | Age: 47
End: 2022-01-05
Payer: COMMERCIAL

## 2022-01-05 VITALS
HEART RATE: 97 BPM | BODY MASS INDEX: 32 KG/M2 | OXYGEN SATURATION: 99 % | DIASTOLIC BLOOD PRESSURE: 76 MMHG | RESPIRATION RATE: 18 BRPM | WEIGHT: 191.25 LBS | TEMPERATURE: 98 F | SYSTOLIC BLOOD PRESSURE: 144 MMHG

## 2022-01-05 DIAGNOSIS — J02.9 SORE THROAT: Primary | ICD-10-CM

## 2022-01-05 LAB
CONTROL LINE PRESENT WITH A CLEAR BACKGROUND (YES/NO): YES YES/NO
KIT LOT #: NORMAL NUMERIC

## 2022-01-05 PROCEDURE — 87081 CULTURE SCREEN ONLY: CPT | Performed by: INTERNAL MEDICINE

## 2022-01-05 PROCEDURE — 3078F DIAST BP <80 MM HG: CPT | Performed by: INTERNAL MEDICINE

## 2022-01-05 PROCEDURE — 3077F SYST BP >= 140 MM HG: CPT | Performed by: INTERNAL MEDICINE

## 2022-01-05 PROCEDURE — 99214 OFFICE O/P EST MOD 30 MIN: CPT | Performed by: INTERNAL MEDICINE

## 2022-01-05 PROCEDURE — 87880 STREP A ASSAY W/OPTIC: CPT | Performed by: INTERNAL MEDICINE

## 2022-01-05 NOTE — PROGRESS NOTES
HPI:   Tan Anderson is a 55year old female who presents for upper respiratory symptoms for  5  days. Patient reports sore throat, congestion, exposure to COVID, vaccinated, but not BOOSTERED. Workds in surgery. 3 rapids at home have been negative!     C OTHER SURGICAL HISTORY  1/1/1991    nose surgery, nose fx   • OTHER SURGICAL HISTORY Right     knee arthroscopy x 2   • OTHER SURGICAL HISTORY      hysteroscopy   • POSTERIOR REPAIR     • SALPINGECTOMY Bilateral 09/06/2018   • TOTAL ABDOM HYSTERECTOMY  9/2

## 2022-01-07 LAB — SARS-COV-2 RNA RESP QL NAA+PROBE: DETECTED

## 2022-01-13 RX ORDER — VENLAFAXINE HYDROCHLORIDE 37.5 MG/1
CAPSULE, EXTENDED RELEASE ORAL
Qty: 90 CAPSULE | Refills: 3 | Status: SHIPPED | OUTPATIENT
Start: 2022-01-13

## 2022-01-13 NOTE — TELEPHONE ENCOUNTER
Requested Prescriptions     Pending Prescriptions Disp Refills   • VENLAFAXINE 37.5 MG Oral Capsule SR 24 Hr [Pharmacy Med Name: VENLAFAXINE HCL ER CAPS 37.5MG] 90 capsule 3     Sig: TAKE 1 CAPSULE DAILY     Last refill #90 on 9/15/2021  Last office visit

## 2022-03-15 ENCOUNTER — PATIENT MESSAGE (OUTPATIENT)
Dept: FAMILY MEDICINE CLINIC | Facility: CLINIC | Age: 47
End: 2022-03-15

## 2022-03-16 PROBLEM — R19.7 DIARRHEA, UNSPECIFIED: Status: ACTIVE | Noted: 2022-03-16

## 2022-03-16 PROBLEM — R10.84 ABDOMINAL PAIN, GENERALIZED: Status: ACTIVE | Noted: 2022-03-16

## 2022-03-16 NOTE — TELEPHONE ENCOUNTER
From: Umberto Abrams  To: Shana Lombardo MD  Sent: 3/15/2022 2:51 PM CDT  Subject: Question    Hi, how do you test for MS & what are the symptoms?      Thanks, Peyton Plata

## 2022-03-21 ENCOUNTER — PATIENT MESSAGE (OUTPATIENT)
Dept: FAMILY MEDICINE CLINIC | Facility: CLINIC | Age: 47
End: 2022-03-21

## 2022-03-21 NOTE — TELEPHONE ENCOUNTER
From: Chris Angulo  Sent: 3/21/2022 3:58 PM CDT  To: Migue Hay Clinical Staff  Subject: Question    [id number] [address], thanks. I have a cousin who was just diagnosed. And I know it can run in some families. And the symptoms can be generic. I was just wondering if there was a definitive test.   Thanks

## 2022-03-31 RX ORDER — SUMATRIPTAN 100 MG/1
100 TABLET, FILM COATED ORAL EVERY 2 HOUR PRN
Qty: 9 TABLET | Refills: 1 | Status: SHIPPED | OUTPATIENT
Start: 2022-03-31

## 2022-04-11 ENCOUNTER — HOSPITAL ENCOUNTER (OUTPATIENT)
Dept: GENERAL RADIOLOGY | Age: 47
Discharge: HOME | End: 2022-04-11
Attending: ORTHOPAEDIC SURGERY
Payer: COMMERCIAL

## 2022-04-11 ENCOUNTER — TELEPHONE (OUTPATIENT)
Dept: ORTHOPEDICS CLINIC | Facility: CLINIC | Age: 47
End: 2022-04-11

## 2022-04-11 ENCOUNTER — OFFICE VISIT (OUTPATIENT)
Dept: ORTHOPEDICS CLINIC | Facility: CLINIC | Age: 47
End: 2022-04-11
Payer: COMMERCIAL

## 2022-04-11 VITALS — WEIGHT: 189.63 LBS | OXYGEN SATURATION: 99 % | BODY MASS INDEX: 31.21 KG/M2 | HEART RATE: 98 BPM | HEIGHT: 65.5 IN

## 2022-04-11 DIAGNOSIS — M17.0 PRIMARY OSTEOARTHRITIS OF BOTH KNEES: Primary | ICD-10-CM

## 2022-04-11 DIAGNOSIS — M25.561 PAIN IN BOTH KNEES, UNSPECIFIED CHRONICITY: ICD-10-CM

## 2022-04-11 DIAGNOSIS — M25.562 PAIN IN BOTH KNEES, UNSPECIFIED CHRONICITY: ICD-10-CM

## 2022-04-11 PROCEDURE — 73564 X-RAY EXAM KNEE 4 OR MORE: CPT | Performed by: ORTHOPAEDIC SURGERY

## 2022-04-11 PROCEDURE — 3008F BODY MASS INDEX DOCD: CPT | Performed by: ORTHOPAEDIC SURGERY

## 2022-04-11 PROCEDURE — 99203 OFFICE O/P NEW LOW 30 MIN: CPT | Performed by: ORTHOPAEDIC SURGERY

## 2022-04-11 PROCEDURE — 20610 DRAIN/INJ JOINT/BURSA W/O US: CPT | Performed by: ORTHOPAEDIC SURGERY

## 2022-04-11 RX ORDER — TRAMADOL HYDROCHLORIDE 50 MG/1
50 TABLET ORAL EVERY 6 HOURS PRN
Qty: 60 TABLET | Refills: 0 | Status: SHIPPED | OUTPATIENT
Start: 2022-04-11

## 2022-04-11 RX ORDER — TRIAMCINOLONE ACETONIDE 40 MG/ML
80 INJECTION, SUSPENSION INTRA-ARTICULAR; INTRAMUSCULAR ONCE
Status: COMPLETED | OUTPATIENT
Start: 2022-04-11 | End: 2022-04-11

## 2022-04-11 RX ADMIN — TRIAMCINOLONE ACETONIDE 80 MG: 40 INJECTION, SUSPENSION INTRA-ARTICULAR; INTRAMUSCULAR at 17:23:00

## 2022-04-11 NOTE — PROCEDURES
After informed consent, the patient's right and left knees were marked, locally anesthetized with skin refrigerant, prepped with topical antiseptic, and injected with a mixture of 1mL 40mg/mL Kenalog, 2mL 1% lidocaine and 2mL 0.5% marcaine through the inferolateral portal.  A band-aid was applied. The patient tolerated the procedure well.     Claire Alvarez MD, [address] [place] [facility]  Phone [phone]  Fax [phone]

## 2022-04-13 ENCOUNTER — PATIENT MESSAGE (OUTPATIENT)
Dept: FAMILY MEDICINE CLINIC | Facility: CLINIC | Age: 47
End: 2022-04-13

## 2022-04-13 NOTE — TELEPHONE ENCOUNTER
From: Rosario Paulino  To: Romero Stephenson MD  Sent: 4/13/2022 8:39 AM CDT  Subject: [address]    Hi, I am going on a mission trip to [place] in May. Last year I went to a travel clinic. All they did was give me a z-pac ( travelers triny ) 5 Predinoze (sp?) is that something you can write me for or do I need to go back to the travel clinic.  We leave 5/12/22    Thanks

## 2022-04-14 RX ORDER — AZITHROMYCIN 250 MG/1
TABLET, FILM COATED ORAL
Qty: 6 TABLET | Refills: 0 | Status: SHIPPED | OUTPATIENT
Start: 2022-04-14 | End: 2022-04-19

## 2022-04-14 RX ORDER — PREDNISONE 20 MG/1
40 TABLET ORAL DAILY
Qty: 14 TABLET | Refills: 0 | Status: SHIPPED | OUTPATIENT
Start: 2022-04-14 | End: 2022-04-21

## 2022-05-05 ENCOUNTER — APPOINTMENT (OUTPATIENT)
Dept: CT IMAGING | Facility: HOSPITAL | Age: 47
End: 2022-05-05
Attending: EMERGENCY MEDICINE
Payer: COMMERCIAL

## 2022-05-05 ENCOUNTER — HOSPITAL ENCOUNTER (EMERGENCY)
Facility: HOSPITAL | Age: 47
Discharge: HOME | End: 2022-05-05
Attending: EMERGENCY MEDICINE
Payer: COMMERCIAL

## 2022-05-05 ENCOUNTER — APPOINTMENT (OUTPATIENT)
Dept: GENERAL RADIOLOGY | Facility: HOSPITAL | Age: 47
End: 2022-05-05
Attending: EMERGENCY MEDICINE
Payer: COMMERCIAL

## 2022-05-05 VITALS
SYSTOLIC BLOOD PRESSURE: 150 MMHG | TEMPERATURE: 97 F | RESPIRATION RATE: 16 BRPM | HEART RATE: 82 BPM | WEIGHT: 190 LBS | DIASTOLIC BLOOD PRESSURE: 82 MMHG | BODY MASS INDEX: 31 KG/M2 | OXYGEN SATURATION: 96 %

## 2022-05-05 DIAGNOSIS — E87.6 HYPOKALEMIA: ICD-10-CM

## 2022-05-05 DIAGNOSIS — R51.9 NONINTRACTABLE HEADACHE, UNSPECIFIED CHRONICITY PATTERN, UNSPECIFIED HEADACHE TYPE: ICD-10-CM

## 2022-05-05 DIAGNOSIS — I10 HYPERTENSION, UNSPECIFIED TYPE: Primary | ICD-10-CM

## 2022-05-05 LAB
ALBUMIN SERPL-MCNC: 3.7 G/DL (ref 3.4–5)
ALBUMIN/GLOB SERPL: 0.9 {RATIO} (ref 1–2)
ALP LIVER SERPL-CCNC: 78 U/L
ALT SERPL-CCNC: 21 U/L
ANION GAP SERPL CALC-SCNC: 6 MMOL/L (ref 0–18)
AST SERPL-CCNC: 19 U/L (ref 15–37)
BASOPHILS # BLD AUTO: 0.03 X10(3) UL (ref 0–0.2)
BASOPHILS NFR BLD AUTO: 0.4 %
BILIRUB SERPL-MCNC: 0.3 MG/DL (ref 0.1–2)
BUN BLD-MCNC: 9 MG/DL (ref 7–18)
CALCIUM BLD-MCNC: 8.6 MG/DL (ref 8.5–10.1)
CHLORIDE SERPL-SCNC: 103 MMOL/L (ref 98–112)
CO2 SERPL-SCNC: 28 MMOL/L (ref 21–32)
CREAT BLD-MCNC: 0.64 MG/DL
EOSINOPHIL # BLD AUTO: 0.02 X10(3) UL (ref 0–0.7)
EOSINOPHIL NFR BLD AUTO: 0.2 %
ERYTHROCYTE [DISTWIDTH] IN BLOOD BY AUTOMATED COUNT: 13.1 %
GLOBULIN PLAS-MCNC: 3.9 G/DL (ref 2.8–4.4)
GLUCOSE BLD-MCNC: 119 MG/DL (ref 70–99)
HCT VFR BLD AUTO: 40.5 %
HGB BLD-MCNC: 14.5 G/DL
IMM GRANULOCYTES # BLD AUTO: 0.02 X10(3) UL (ref 0–1)
IMM GRANULOCYTES NFR BLD: 0.2 %
LYMPHOCYTES # BLD AUTO: 0.98 X10(3) UL (ref 1–4)
LYMPHOCYTES NFR BLD AUTO: 12 %
MCH RBC QN AUTO: 29.8 PG (ref 26–34)
MCHC RBC AUTO-ENTMCNC: 35.8 G/DL (ref 31–37)
MCV RBC AUTO: 83.2 FL
MONOCYTES # BLD AUTO: 0.47 X10(3) UL (ref 0.1–1)
MONOCYTES NFR BLD AUTO: 5.7 %
NEUTROPHILS # BLD AUTO: 6.66 X10 (3) UL (ref 1.5–7.7)
NEUTROPHILS # BLD AUTO: 6.66 X10(3) UL (ref 1.5–7.7)
NEUTROPHILS NFR BLD AUTO: 81.5 %
OSMOLALITY SERPL CALC.SUM OF ELEC: 284 MOSM/KG (ref 275–295)
PLATELET # BLD AUTO: 239 10(3)UL (ref 150–450)
POTASSIUM SERPL-SCNC: 3.2 MMOL/L (ref 3.5–5.1)
PROT SERPL-MCNC: 7.6 G/DL (ref 6.4–8.2)
RBC # BLD AUTO: 4.87 X10(6)UL
SARS-COV-2 RNA RESP QL NAA+PROBE: NOT DETECTED
SODIUM SERPL-SCNC: 137 MMOL/L (ref 136–145)
TROPONIN I HIGH SENSITIVITY: 3 NG/L
WBC # BLD AUTO: 8.2 X10(3) UL (ref 4–11)

## 2022-05-05 PROCEDURE — 96374 THER/PROPH/DIAG INJ IV PUSH: CPT

## 2022-05-05 PROCEDURE — 93010 ELECTROCARDIOGRAM REPORT: CPT

## 2022-05-05 PROCEDURE — 96361 HYDRATE IV INFUSION ADD-ON: CPT

## 2022-05-05 PROCEDURE — 85025 COMPLETE CBC W/AUTO DIFF WBC: CPT | Performed by: EMERGENCY MEDICINE

## 2022-05-05 PROCEDURE — 99285 EMERGENCY DEPT VISIT HI MDM: CPT

## 2022-05-05 PROCEDURE — 71045 X-RAY EXAM CHEST 1 VIEW: CPT | Performed by: EMERGENCY MEDICINE

## 2022-05-05 PROCEDURE — 96375 TX/PRO/DX INJ NEW DRUG ADDON: CPT

## 2022-05-05 PROCEDURE — 84484 ASSAY OF TROPONIN QUANT: CPT | Performed by: EMERGENCY MEDICINE

## 2022-05-05 PROCEDURE — 80053 COMPREHEN METABOLIC PANEL: CPT | Performed by: EMERGENCY MEDICINE

## 2022-05-05 PROCEDURE — 70450 CT HEAD/BRAIN W/O DYE: CPT | Performed by: EMERGENCY MEDICINE

## 2022-05-05 RX ORDER — METOCLOPRAMIDE HYDROCHLORIDE 5 MG/ML
5 INJECTION INTRAMUSCULAR; INTRAVENOUS ONCE
Status: COMPLETED | OUTPATIENT
Start: 2022-05-05 | End: 2022-05-05

## 2022-05-05 RX ORDER — KETOROLAC TROMETHAMINE 30 MG/ML
30 INJECTION, SOLUTION INTRAMUSCULAR; INTRAVENOUS ONCE
Status: COMPLETED | OUTPATIENT
Start: 2022-05-05 | End: 2022-05-05

## 2022-05-05 RX ORDER — POTASSIUM CHLORIDE 20 MEQ/1
20 TABLET, EXTENDED RELEASE ORAL ONCE
Status: COMPLETED | OUTPATIENT
Start: 2022-05-05 | End: 2022-05-05

## 2022-05-05 RX ORDER — SODIUM CHLORIDE 9 MG/ML
1000 INJECTION, SOLUTION INTRAVENOUS ONCE
Status: COMPLETED | OUTPATIENT
Start: 2022-05-05 | End: 2022-05-05

## 2022-05-05 RX ORDER — DIPHENHYDRAMINE HYDROCHLORIDE 50 MG/ML
25 INJECTION INTRAMUSCULAR; INTRAVENOUS ONCE
Status: COMPLETED | OUTPATIENT
Start: 2022-05-05 | End: 2022-05-05

## 2022-05-05 RX ORDER — LISINOPRIL 10 MG/1
10 TABLET ORAL DAILY
Qty: 21 TABLET | Refills: 0 | Status: SHIPPED | OUTPATIENT
Start: 2022-05-05

## 2022-05-05 NOTE — ED INITIAL ASSESSMENT (HPI)
Saw md for reg checkup yesterday,  B/P was a little high. Has migraine today, not relieved with meds.

## 2022-05-10 LAB
ATRIAL RATE: 80 BPM
P AXIS: 41 DEGREES
P-R INTERVAL: 156 MS
Q-T INTERVAL: 388 MS
QRS DURATION: 76 MS
QTC CALCULATION (BEZET): 447 MS
R AXIS: -4 DEGREES
T AXIS: 31 DEGREES
VENTRICULAR RATE: 80 BPM

## 2022-06-25 ENCOUNTER — PATIENT MESSAGE (OUTPATIENT)
Dept: ORTHOPEDICS CLINIC | Facility: CLINIC | Age: 47
End: 2022-06-25

## 2022-06-27 NOTE — TELEPHONE ENCOUNTER
From: Mere Escobar  To: Adrián Esparza MD  Sent: 6/25/2022 10:54 PM CDT  Subject: Question     Hi,   My  got bit by a spider a couple weeks ago in [employer] for work. He was on antibiotic. He still has pain in his knee. Any chance you could see him some time next week?      Aurelio Carcamo

## 2022-07-14 RX ORDER — TRAMADOL HYDROCHLORIDE 50 MG/1
50 TABLET ORAL EVERY 6 HOURS PRN
Qty: 60 TABLET | Refills: 0 | Status: SHIPPED | OUTPATIENT
Start: 2022-07-14

## 2022-07-14 NOTE — TELEPHONE ENCOUNTER
DOS: n/a  Last OV: 4/11/22  Last refill date: 4/11/22     #/refills: 60/0    Future Appointments   Date Time Provider Roxy Vasquez   7/20/2022  4:00 PM Pascual Marie PA-C [facility]

## 2022-07-20 ENCOUNTER — OFFICE VISIT (OUTPATIENT)
Dept: ORTHOPEDICS CLINIC | Facility: CLINIC | Age: 47
End: 2022-07-20
Payer: COMMERCIAL

## 2022-07-20 VITALS — OXYGEN SATURATION: 98 % | HEART RATE: 98 BPM

## 2022-07-20 DIAGNOSIS — M17.0 PRIMARY OSTEOARTHRITIS OF BOTH KNEES: Primary | ICD-10-CM

## 2022-07-20 PROCEDURE — 20610 DRAIN/INJ JOINT/BURSA W/O US: CPT | Performed by: PHYSICIAN ASSISTANT

## 2022-07-20 RX ORDER — TRIAMCINOLONE ACETONIDE 40 MG/ML
80 INJECTION, SUSPENSION INTRA-ARTICULAR; INTRAMUSCULAR ONCE
Status: COMPLETED | OUTPATIENT
Start: 2022-07-20 | End: 2022-07-20

## 2022-07-20 RX ADMIN — TRIAMCINOLONE ACETONIDE 80 MG: 40 INJECTION, SUSPENSION INTRA-ARTICULAR; INTRAMUSCULAR at 16:33:00

## 2022-07-20 NOTE — PROCEDURES
After informed consent, the patient's right and left knees were marked, locally anesthetized with skin refrigerant, prepped with topical antiseptic, and injected with a mixture of 1mL 40mg/mL Kenalog, 2mL 1% lidocaine and 2mL 0.5% marcaine through the inferolateral portal.  A band-aid was applied. The patient tolerated the procedure well.     Nestor Glover PA-C  [address] [facility]

## 2022-08-01 RX ORDER — SUMATRIPTAN 100 MG/1
100 TABLET, FILM COATED ORAL EVERY 2 HOUR PRN
Qty: 9 TABLET | Refills: 1 | Status: SHIPPED | OUTPATIENT
Start: 2022-08-01

## 2022-09-20 ENCOUNTER — PATIENT MESSAGE (OUTPATIENT)
Dept: FAMILY MEDICINE CLINIC | Facility: CLINIC | Age: 47
End: 2022-09-20

## 2022-09-20 RX ORDER — BETAMETHASONE DIPROPIONATE 0.5 MG/G
1 CREAM TOPICAL DAILY
Qty: 50 G | Refills: 1 | Status: SHIPPED | OUTPATIENT
Start: 2022-09-20 | End: 2022-10-20

## 2022-09-20 NOTE — TELEPHONE ENCOUNTER
From: Chiquis Abbott  To: Abigail Bustillos MD  Sent: 9/20/2022 7:07 AM CDT  Subject: Dermatitis     Hi,   I have a really painful rash on my hands. I have had it in the past when using Purel hand . I must have used something to make it act up again. I have tried over the counter hydrocortisone for 3 days but it's not working. I was wondering if you could call in a script for a steroid cream. In past I used something that started with tri. I'm willing to do a video visit too if need be. Thanks so much.      Marquez Golden

## 2022-09-21 NOTE — TELEPHONE ENCOUNTER
From: Lorena Cartagena  To: Corry Ramirez MD  Sent: 9/20/2022 7:07 AM CDT  Subject: Dermatitis     Hi,   I have a really painful rash on my hands. I have had it in the past when using Purel hand . I must have used something to make it act up again. I have tried over the counter hydrocortisone for 3 days but it's not working. I was wondering if you could call in a script for a steroid cream. In past I used something that started with tri. I'm willing to do a video visit too if need be. Thanks so much.      Markell Page

## 2022-10-03 RX ORDER — PREDNISONE 20 MG/1
40 TABLET ORAL DAILY
Qty: 14 TABLET | Refills: 0 | OUTPATIENT
Start: 2022-10-03 | End: 2022-10-10

## 2022-10-06 RX ORDER — TRAMADOL HYDROCHLORIDE 50 MG/1
50 TABLET ORAL EVERY 6 HOURS PRN
Qty: 60 TABLET | Refills: 0 | Status: SHIPPED | OUTPATIENT
Start: 2022-10-06

## 2022-10-06 NOTE — TELEPHONE ENCOUNTER
Medication requested: Tramadol  DOS: n.a  Last OV: 7/20/2022   Last refill date: 7/14/22   #/refills: 60/0  Upcoming appt: No future appointments.     RX pended for review and approval

## 2022-12-19 RX ORDER — SUMATRIPTAN 100 MG/1
100 TABLET, FILM COATED ORAL EVERY 2 HOUR PRN
Qty: 9 TABLET | Refills: 1 | Status: SHIPPED | OUTPATIENT
Start: 2022-12-19

## 2022-12-19 RX ORDER — TRAMADOL HYDROCHLORIDE 50 MG/1
50 TABLET ORAL EVERY 6 HOURS PRN
Qty: 60 TABLET | Refills: 0 | Status: SHIPPED | OUTPATIENT
Start: 2022-12-19

## 2022-12-19 NOTE — TELEPHONE ENCOUNTER
LOV:7/20/22 Alec Blinks on file   LAST RX:10/6/22 50 mg take 1 tab as needed every 6 hours QTY:60 tabs 0 refills   LAST LABS:  PER PROTOCOL: to provider

## 2023-01-16 ENCOUNTER — PATIENT MESSAGE (OUTPATIENT)
Dept: FAMILY MEDICINE CLINIC | Facility: CLINIC | Age: 48
End: 2023-01-16

## 2023-01-18 RX ORDER — BETAMETHASONE DIPROPIONATE 0.5 MG/G
1 CREAM TOPICAL DAILY
Qty: 50 G | Refills: 1 | Status: SHIPPED | OUTPATIENT
Start: 2023-01-18 | End: 2023-02-17

## 2023-01-18 NOTE — TELEPHONE ENCOUNTER
From: Jacki Villarreal  To: Roxana Grewal MD  Sent: 1/16/2023 10:36 AM CST  Subject: diprolene refill    Hi,   I need another tube of diprolene pls. My hands are super inflamed again.  Thanks

## 2023-03-08 RX ORDER — VENLAFAXINE HYDROCHLORIDE 37.5 MG/1
37.5 CAPSULE, EXTENDED RELEASE ORAL DAILY
Qty: 90 CAPSULE | Refills: 1 | Status: SHIPPED | OUTPATIENT
Start: 2023-03-08

## 2023-03-08 NOTE — TELEPHONE ENCOUNTER
LOV  1-5-22    LAST LAB  5-5-22 Chem Profile/CBC/Troponin    LAST RX 1-9-23 #90 RF 1     Next OV No future appointments.       Previous refill sent to [facility] patient requesting refill be sent to    [facility] on [address]

## 2023-03-21 NOTE — TELEPHONE ENCOUNTER
Last OV: 07/20/22  Last refill date: 12/19/22     #/refills: 60/0  Upcoming appt: No future appointments.

## 2023-03-22 RX ORDER — TRAMADOL HYDROCHLORIDE 50 MG/1
50 TABLET ORAL EVERY 6 HOURS PRN
Qty: 60 TABLET | Refills: 0 | Status: SHIPPED | OUTPATIENT
Start: 2023-03-22

## 2023-06-28 DIAGNOSIS — N95.2 VAGINAL ATROPHY: Primary | ICD-10-CM

## 2023-06-28 RX ORDER — ESTRADIOL 0.1 MG/G
CREAM VAGINAL
Qty: 42 G | Refills: 3 | Status: SHIPPED | OUTPATIENT
Start: 2023-06-28

## 2023-07-03 ENCOUNTER — LAB ENCOUNTER (OUTPATIENT)
Dept: LAB | Age: 48
End: 2023-07-03
Attending: INTERNAL MEDICINE
Payer: COMMERCIAL

## 2023-07-03 DIAGNOSIS — Z00.00 WELL ADULT EXAM: ICD-10-CM

## 2023-07-03 LAB
CHOLEST SERPL-MCNC: 196 MG/DL (ref ?–200)
FASTING PATIENT LIPID ANSWER: YES
HDLC SERPL-MCNC: 73 MG/DL (ref 40–59)
LDLC SERPL CALC-MCNC: 100 MG/DL (ref ?–100)
NONHDLC SERPL-MCNC: 123 MG/DL (ref ?–130)
T4 FREE SERPL-MCNC: 0.9 NG/DL (ref 0.8–1.7)
TRIGL SERPL-MCNC: 134 MG/DL (ref 30–149)
TSI SER-ACNC: 1.85 MIU/ML (ref 0.36–3.74)
VLDLC SERPL CALC-MCNC: 22 MG/DL (ref 0–30)

## 2023-07-03 PROCEDURE — 84443 ASSAY THYROID STIM HORMONE: CPT

## 2023-07-03 PROCEDURE — 84439 ASSAY OF FREE THYROXINE: CPT

## 2023-07-03 PROCEDURE — 80061 LIPID PANEL: CPT

## 2023-07-03 PROCEDURE — 36415 COLL VENOUS BLD VENIPUNCTURE: CPT

## 2023-07-07 RX ORDER — VENLAFAXINE HYDROCHLORIDE 37.5 MG/1
CAPSULE, EXTENDED RELEASE ORAL
Qty: 90 CAPSULE | Refills: 3 | Status: SHIPPED | OUTPATIENT
Start: 2023-07-07

## 2023-07-07 NOTE — TELEPHONE ENCOUNTER
VENLAFAXINE ER 37.5 MG Oral Capsule SR 24 Hr     Last office visit:  10/6/21    Future Appointments   Date Time Provider Roxy Christina   7/11/2023  4:00 PM Chaya Kimbrough MD [facility]   Last filled:  3/8/23    #90 with 1 refill   Last labs:  7/3/23       Called and LM for [place] to call back and set up a physical

## 2023-08-05 ENCOUNTER — PATIENT MESSAGE (OUTPATIENT)
Dept: ORTHOPEDICS CLINIC | Facility: CLINIC | Age: 48
End: 2023-08-05

## 2023-08-07 NOTE — TELEPHONE ENCOUNTER
Patient called requesting to be seen this week by Joy Luque or MICHAEL Aggarwal for a eval since she hurt her left knee . Please advise.

## 2023-08-07 NOTE — TELEPHONE ENCOUNTER
Please advise where we should get her added . Patient last seen by us 7/20/22 for OA of both knee's    Called patient to see what's going on she stated the following     She was at [employer] and was doing a lot of snorkling and swimming when she did snorkling the last day she was going in the water from a seated position she felt a pop and then felt it pop back in . She mentions the pain is most likely with he patella tendon as she has pain in the front and inside of her knee . She note she does ha\ve bruising which she finds weird. She states she is fine with standing its the walking that is causing her a lot of pain  she notes that she has been using ice and ibuprofen. She's states she has stage 4 OA in her knees and has had injections in  the past and scopes done she knows her typical range of motion however  she is unable to extend fwd without pain. She would like to know what she can do in the meantime to help with pain and would like to know if an MR is something that is indicated. She's fine with seeing Jessee RODRIGUEZ or Caesar Zacarias she just would like to know what to do in the mean time.

## 2023-08-07 NOTE — TELEPHONE ENCOUNTER
My-chart message sent to find out which knee as she will need weight bearing images of her knee per Vikas March POST- ANESTHESIA EVALUATION       Pt Name: Edna Mackenzie  MRN: [id number]  YOB: 1968  Date of evaluation: 9/25/2020  Time:  11:38 AM      BP (!) 132/90   Pulse 62   Temp 97.7 °F (36.5 °C) (Temporal)   Resp 12   Ht 6' 1\" (1.854 m)   Wt 190 lb (86.2 kg)   SpO2 99%   BMI 25.07 kg/m²      Consciousness Level  Awake  Cardiopulmonary Status  Stable  Pain Adequately Treated YES  Nausea / Vomiting  NO  Adequate Hydration  YES  Anesthesia Related Complications NONE      Electronically signed by Aida Mcginnis MD on 9/25/2020 at 11:38 AM       Department of Anesthesiology  Postprocedure Note    Patient: Edna Mackenzie  MRN: [id number]  YOB: 1968  Date of evaluation: 9/25/2020  Time:  11:38 AM     Procedure Summary     Date:  09/25/20 Room / Location:  [address] [place] [address] [place]  / [facility]: [facility]    Anesthesia Start:  2946 Anesthesia Stop:  5636    Procedure: HERNIA UMBILICAL REPAIR OPEN WITH MESH (N/A Abdomen) Diagnosis:       (UMBILICAL HERNIA)      (PAT ON ADMIT/PATIENT ON NO MEDS)    Surgeon:  La Kim MD Responsible Provider:  Aida Mcginnis MD    Anesthesia Type:  general ASA Status:  1          Anesthesia Type: general    Cornelius Phase I: Cornelius Score: 10    Cornelius Phase II: Cornelius Score: 10    Last vitals: Reviewed and per EMR flowsheets.        Anesthesia Post Evaluation

## 2023-08-08 RX ORDER — TRAMADOL HYDROCHLORIDE 50 MG/1
50 TABLET ORAL EVERY 6 HOURS PRN
Qty: 60 TABLET | Refills: 0 | Status: SHIPPED | OUTPATIENT
Start: 2023-08-08

## 2023-08-08 NOTE — TELEPHONE ENCOUNTER
Tramadol      Last OV: 07/20/23  Last refill date: 03/22/23 #/refills: 60/0  Upcoming appt:    Future Appointments   Date Time Provider Roxy Christina   8/9/2023  9:30 AM Audie Christian PA-C [facility]   9/9/2023  8:00 AM Khagn Reyna MD [facility]

## 2023-08-14 ENCOUNTER — OFFICE VISIT (OUTPATIENT)
Dept: ORTHOPEDICS CLINIC | Facility: CLINIC | Age: 48
End: 2023-08-14
Payer: COMMERCIAL

## 2023-08-14 ENCOUNTER — HOSPITAL ENCOUNTER (OUTPATIENT)
Dept: GENERAL RADIOLOGY | Age: 48
Discharge: HOME | End: 2023-08-14
Attending: ORTHOPAEDIC SURGERY
Payer: COMMERCIAL

## 2023-08-14 VITALS — HEIGHT: 65.5 IN | BODY MASS INDEX: 31.27 KG/M2 | WEIGHT: 190 LBS

## 2023-08-14 DIAGNOSIS — M17.0 PRIMARY OSTEOARTHRITIS OF BOTH KNEES: Primary | ICD-10-CM

## 2023-08-14 DIAGNOSIS — M25.562 LEFT KNEE PAIN, UNSPECIFIED CHRONICITY: ICD-10-CM

## 2023-08-14 PROCEDURE — 73564 X-RAY EXAM KNEE 4 OR MORE: CPT | Performed by: ORTHOPAEDIC SURGERY

## 2023-08-14 RX ORDER — NALOXONE HYDROCHLORIDE 4 MG/.1ML
SPRAY NASAL
COMMUNITY
Start: 2023-08-08

## 2023-08-14 RX ORDER — TRIAMCINOLONE ACETONIDE 40 MG/ML
80 INJECTION, SUSPENSION INTRA-ARTICULAR; INTRAMUSCULAR ONCE
Status: COMPLETED | OUTPATIENT
Start: 2023-08-14 | End: 2023-08-14

## 2023-08-14 RX ADMIN — TRIAMCINOLONE ACETONIDE 80 MG: 40 INJECTION, SUSPENSION INTRA-ARTICULAR; INTRAMUSCULAR at 16:35:00

## 2023-08-14 NOTE — PROGRESS NOTES
[facility] Progress Note    Subjective: Patient returns to clinic after last being seen by me on 7/20/2022 for bilateral knee corticosteroid injections. Since last visit, she reports that a couple weeks ago, she was jumping off the side of a boat, and felt her left knee Sublux out of and back into place. Since the injury, her knee pain has been improving, however she has continued to experience pain primarily through the anterior aspect of the knee and points overlying the patellar tendon. She also reports that she recently landed directly onto the anterior aspect of the right knee, causing pain and bruising to develop just distal to the patella. She is interested in repeat injections into both knees in clinic today. Objective: Patient seated comfortably in the exam chair. Alert and oriented x3. Nonlabored breathing. Gross neurologically intact. Examination of the left knee demonstrates no redness, warmth, effusion of the left knee. There is no tenderness along either the medial or lateral patellar facet, MPFL, lateral patellar retinaculum. There is some tenderness along the patellar tendon. Patient demonstrates full active knee extension and flexion approximately 120 degrees. Imaging: Radiographs of the left knee obtained today personally viewed, independently interpreted and radiology report read. Radiographs demonstrate moderate to severe medial compartmental joint space narrowing with osteophytic lipping. No acute osseous abnormalities. Assessment/Plan: Patient with exacerbated bilateral knee osteoarthritis. The patient expressed interest in receiving steroid injections in clinic today and injections were administered, please see separate procedure note for additional details. I did provide the patient with the core flex knee brace for the left knee and encouraged her to wear this to aid with stability of the left knee. She can follow-up with our clinic as needed.       Naveed Mitchell Madonna Velazco, [address] [facility]    This note was dictated using Dragon software. While it was briefly proofread prior to completion, some grammatical, spelling, and word choice errors due to dictation may still occur.

## 2023-08-14 NOTE — PROCEDURES
After informed consent, the patient's right and left knees were marked, locally anesthetized with skin refrigerant, prepped with topical antiseptic, and injected with a mixture of 1mL 40mg/mL Kenalog, 2mL 1% lidocaine and 2mL 0.5% marcaine through the inferolateral portal.  A band-aid was applied. The patient tolerated the procedure well.     Nayla Carey PA-C  [address] Orthopedic Surgery
126

## 2023-08-22 ENCOUNTER — PATIENT MESSAGE (OUTPATIENT)
Dept: ORTHOPEDICS CLINIC | Facility: CLINIC | Age: 48
End: 2023-08-22

## 2023-08-22 DIAGNOSIS — M17.0 PRIMARY OSTEOARTHRITIS OF BOTH KNEES: Primary | ICD-10-CM

## 2023-08-22 RX ORDER — METHYLPREDNISOLONE 4 MG/1
TABLET ORAL
Qty: 21 EACH | Refills: 0 | Status: SHIPPED | OUTPATIENT
Start: 2023-08-22

## 2023-08-22 NOTE — TELEPHONE ENCOUNTER
OV 8/14/23, bilat knee OA    Pt requesting oral steroid (or another med) to help with swelling and pain. Still wearing brace, icing (helps with swelling), compression socks. Taking ibuprofen 800mg every 6 hours. Switches to tramadol on weekends. Constant throbbing with episodes of sharp pain.

## 2023-08-22 NOTE — TELEPHONE ENCOUNTER
From: Yudith Stern  To: Rip Stuart PA-C  Sent: 8/22/2023 10:54 AM CDT  Subject: Medication question     Hi,   I was wondering if you think a oral steroid could help my knee pain or if there's something else? I am still pretty swollen & uncomfortable.  Thanks     Smiles, [employer]

## 2023-09-09 ENCOUNTER — OFFICE VISIT (OUTPATIENT)
Dept: FAMILY MEDICINE CLINIC | Facility: CLINIC | Age: 48
End: 2023-09-09
Payer: COMMERCIAL

## 2023-09-09 VITALS
BODY MASS INDEX: 30.7 KG/M2 | HEART RATE: 84 BPM | SYSTOLIC BLOOD PRESSURE: 136 MMHG | RESPIRATION RATE: 16 BRPM | HEIGHT: 65.5 IN | TEMPERATURE: 98 F | OXYGEN SATURATION: 100 % | WEIGHT: 186.5 LBS | DIASTOLIC BLOOD PRESSURE: 78 MMHG

## 2023-09-09 DIAGNOSIS — Z13.9 SCREENING DUE: Primary | ICD-10-CM

## 2023-09-09 DIAGNOSIS — Z00.00 WELL ADULT EXAM: ICD-10-CM

## 2023-09-09 LAB
ALBUMIN SERPL-MCNC: 3.5 G/DL (ref 3.4–5)
ALBUMIN/GLOB SERPL: 1 {RATIO} (ref 1–2)
ALP LIVER SERPL-CCNC: 71 U/L
ALT SERPL-CCNC: 21 U/L
ANION GAP SERPL CALC-SCNC: 2 MMOL/L (ref 0–18)
AST SERPL-CCNC: 9 U/L (ref 15–37)
BASOPHILS # BLD AUTO: 0.03 X10(3) UL (ref 0–0.2)
BASOPHILS NFR BLD AUTO: 0.4 %
BILIRUB SERPL-MCNC: 0.4 MG/DL (ref 0.1–2)
BUN BLD-MCNC: 9 MG/DL (ref 7–18)
CALCIUM BLD-MCNC: 8.4 MG/DL (ref 8.5–10.1)
CHLORIDE SERPL-SCNC: 105 MMOL/L (ref 98–112)
CO2 SERPL-SCNC: 30 MMOL/L (ref 21–32)
CREAT BLD-MCNC: 0.78 MG/DL
EGFRCR SERPLBLD CKD-EPI 2021: 94 ML/MIN/1.73M2 (ref 60–?)
EOSINOPHIL # BLD AUTO: 0.09 X10(3) UL (ref 0–0.7)
EOSINOPHIL NFR BLD AUTO: 1.3 %
ERYTHROCYTE [DISTWIDTH] IN BLOOD BY AUTOMATED COUNT: 13.1 %
EST. AVERAGE GLUCOSE BLD GHB EST-MCNC: 94 MG/DL (ref 68–126)
FASTING STATUS PATIENT QL REPORTED: YES
GLOBULIN PLAS-MCNC: 3.4 G/DL (ref 2.8–4.4)
GLUCOSE BLD-MCNC: 97 MG/DL (ref 70–99)
HBA1C MFR BLD: 4.9 % (ref ?–5.7)
HCT VFR BLD AUTO: 39.6 %
HGB BLD-MCNC: 13.5 G/DL
IMM GRANULOCYTES # BLD AUTO: 0.02 X10(3) UL (ref 0–1)
IMM GRANULOCYTES NFR BLD: 0.3 %
LYMPHOCYTES # BLD AUTO: 1.6 X10(3) UL (ref 1–4)
LYMPHOCYTES NFR BLD AUTO: 23.1 %
MCH RBC QN AUTO: 29.3 PG (ref 26–34)
MCHC RBC AUTO-ENTMCNC: 34.1 G/DL (ref 31–37)
MCV RBC AUTO: 85.9 FL
MONOCYTES # BLD AUTO: 0.56 X10(3) UL (ref 0.1–1)
MONOCYTES NFR BLD AUTO: 8.1 %
NEUTROPHILS # BLD AUTO: 4.63 X10 (3) UL (ref 1.5–7.7)
NEUTROPHILS # BLD AUTO: 4.63 X10(3) UL (ref 1.5–7.7)
NEUTROPHILS NFR BLD AUTO: 66.8 %
OSMOLALITY SERPL CALC.SUM OF ELEC: 283 MOSM/KG (ref 275–295)
PLATELET # BLD AUTO: 246 10(3)UL (ref 150–450)
POTASSIUM SERPL-SCNC: 3.6 MMOL/L (ref 3.5–5.1)
PROT SERPL-MCNC: 6.9 G/DL (ref 6.4–8.2)
RBC # BLD AUTO: 4.61 X10(6)UL
SODIUM SERPL-SCNC: 137 MMOL/L (ref 136–145)
WBC # BLD AUTO: 6.9 X10(3) UL (ref 4–11)

## 2023-09-09 PROCEDURE — 3008F BODY MASS INDEX DOCD: CPT | Performed by: INTERNAL MEDICINE

## 2023-09-09 PROCEDURE — 80053 COMPREHEN METABOLIC PANEL: CPT | Performed by: INTERNAL MEDICINE

## 2023-09-09 PROCEDURE — 85025 COMPLETE CBC W/AUTO DIFF WBC: CPT | Performed by: INTERNAL MEDICINE

## 2023-09-09 PROCEDURE — 83036 HEMOGLOBIN GLYCOSYLATED A1C: CPT | Performed by: INTERNAL MEDICINE

## 2023-09-09 PROCEDURE — 3078F DIAST BP <80 MM HG: CPT | Performed by: INTERNAL MEDICINE

## 2023-09-09 PROCEDURE — 99396 PREV VISIT EST AGE 40-64: CPT | Performed by: INTERNAL MEDICINE

## 2023-09-09 PROCEDURE — 3075F SYST BP GE 130 - 139MM HG: CPT | Performed by: INTERNAL MEDICINE

## 2023-09-18 RX ORDER — VENLAFAXINE HYDROCHLORIDE 37.5 MG/1
37.5 CAPSULE, EXTENDED RELEASE ORAL DAILY
Qty: 90 CAPSULE | Refills: 0 | OUTPATIENT
Start: 2023-09-18

## 2023-10-14 ENCOUNTER — HOSPITAL ENCOUNTER (OUTPATIENT)
Dept: MAMMOGRAPHY | Facility: HOSPITAL | Age: 48
Discharge: HOME | End: 2023-10-14
Attending: INTERNAL MEDICINE
Payer: COMMERCIAL

## 2023-10-14 DIAGNOSIS — Z00.00 WELL ADULT EXAM: ICD-10-CM

## 2023-10-14 PROCEDURE — 77067 SCR MAMMO BI INCL CAD: CPT | Performed by: INTERNAL MEDICINE

## 2023-10-14 PROCEDURE — 77063 BREAST TOMOSYNTHESIS BI: CPT | Performed by: INTERNAL MEDICINE

## 2023-10-16 NOTE — TELEPHONE ENCOUNTER
Tramadol    Last OV: 08/14/23  Last refill date: 08/08/23 #/refills: 60/0  Upcoming appt: No future appointments.

## 2023-10-16 NOTE — TELEPHONE ENCOUNTER
Last refill: 12/19/23  qtY: 9 tabs  W/ 1 refills  Last ov: 09/09/23    Requested Prescriptions     Pending Prescriptions Disp Refills    SUMAtriptan Succinate (IMITREX) 100 MG Oral Tab 9 tablet 1     Sig: Take 1 tablet (100 mg total) by mouth every 2 (two) hours as needed for Migraine. Use at onset; repeat once after 2 HRS-ONLY 2 IN 24 HR MAX     No future appointments.

## 2023-10-17 RX ORDER — SUMATRIPTAN 100 MG/1
100 TABLET, FILM COATED ORAL EVERY 2 HOUR PRN
Qty: 9 TABLET | Refills: 1 | Status: SHIPPED | OUTPATIENT
Start: 2023-10-17

## 2023-10-17 RX ORDER — TRAMADOL HYDROCHLORIDE 50 MG/1
50 TABLET ORAL EVERY 6 HOURS PRN
Qty: 60 TABLET | Refills: 0 | Status: SHIPPED | OUTPATIENT
Start: 2023-10-17

## 2023-10-27 ENCOUNTER — PATIENT MESSAGE (OUTPATIENT)
Dept: FAMILY MEDICINE CLINIC | Facility: CLINIC | Age: 48
End: 2023-10-27

## 2023-10-30 RX ORDER — LOSARTAN POTASSIUM 50 MG/1
50 TABLET ORAL DAILY
Qty: 90 TABLET | Refills: 0 | Status: SHIPPED | OUTPATIENT
Start: 2023-10-30 | End: 2024-01-28

## 2023-10-30 NOTE — TELEPHONE ENCOUNTER
From: Luisito Rank  To: Maureen Marie  Sent: 10/27/2023 3:42 PM CDT  Subject: Blood pressure     Hi,   I have been taking my blood pressure for a week every day. It's pretty consistently 150-160 /( today was the 110) Do you think I should start taking something? We talked about it at the appointment but I don't think you prescribed anything.    Thanks, Aurelio

## 2023-10-31 DIAGNOSIS — M17.0 PRIMARY OSTEOARTHRITIS OF BOTH KNEES: ICD-10-CM

## 2023-11-01 ENCOUNTER — PATIENT MESSAGE (OUTPATIENT)
Dept: FAMILY MEDICINE CLINIC | Facility: CLINIC | Age: 48
End: 2023-11-01

## 2023-11-01 RX ORDER — METHYLPREDNISOLONE 4 MG/1
TABLET ORAL
Qty: 21 EACH | Refills: 0 | Status: SHIPPED | OUTPATIENT
Start: 2023-11-01

## 2023-11-01 RX ORDER — AZITHROMYCIN 250 MG/1
TABLET, FILM COATED ORAL
Qty: 6 TABLET | Refills: 0 | Status: SHIPPED | OUTPATIENT
Start: 2023-11-01 | End: 2023-11-05

## 2023-11-01 NOTE — TELEPHONE ENCOUNTER
From: Chacha Nation  To: Tashia Monterroso  Sent: 11/1/2023 10:33 AM CDT  Subject: [address] trip     I'm going on another mission trip. Can I get a script for a [facility] pls.    Thanks

## 2023-11-01 NOTE — TELEPHONE ENCOUNTER
Methylprednisolone    Last OV: 08/14/23  Last refill date: 08/22/23 #/refills: 21/0  Upcoming appt: No future appointments. Patient comment: Knee is acting up again.

## 2023-12-05 ENCOUNTER — E-VISIT (OUTPATIENT)
Dept: TELEHEALTH | Age: 48
End: 2023-12-05
Payer: COMMERCIAL

## 2023-12-05 DIAGNOSIS — H10.33 ACUTE CONJUNCTIVITIS OF BOTH EYES, UNSPECIFIED ACUTE CONJUNCTIVITIS TYPE: Primary | ICD-10-CM

## 2023-12-05 PROCEDURE — 99422 OL DIG E/M SVC 11-20 MIN: CPT | Performed by: PHYSICIAN ASSISTANT

## 2023-12-05 RX ORDER — POLYMYXIN B SULFATE AND TRIMETHOPRIM 1; 10000 MG/ML; [USP'U]/ML
1 SOLUTION OPHTHALMIC EVERY 6 HOURS
Qty: 10 ML | Refills: 0 | Status: SHIPPED | OUTPATIENT
Start: 2023-12-05 | End: 2023-12-10

## 2023-12-05 NOTE — PROGRESS NOTES
Eleazar Parra is a 50year old female who initiated e-visit care today. HPI:   See answers to questionnaire submission     Current Outpatient Medications   Medication Sig Dispense Refill    losartan 50 MG Oral Tab Take 1 tablet (50 mg total) by mouth daily. 90 tablet 0    SUMAtriptan Succinate (IMITREX) 100 MG Oral Tab Take 1 tablet (100 mg total) by mouth every 2 (two) hours as needed for Migraine. Use at onset; repeat once after 2 HRS-ONLY 2 IN 24 HR MAX 9 tablet 1    traMADol 50 MG Oral Tab Take 1 tablet (50 mg total) by mouth every 6 (six) hours as needed for Pain. 60 tablet 0    VENLAFAXINE ER 37.5 MG Oral Capsule SR 24 Hr TAKE 1 CAPSULE DAILY 90 capsule 3    estradiol (ESTRACE) 0.1 MG/GM Vaginal Cream Apply 1/2 gram vaginally 2 times per week. 42 g 3    ibuprofen 600 MG Oral Tab Take 1 tablet (600 mg total) by mouth every 6 (six) hours as needed. 30 tablet 3    Multiple Vitamins-Minerals (MULTI COMPLETE/IRON) Oral Tab Take by mouth daily. Loratadine-Pseudoephedrine (CLARITIN-D 12 HOUR OR) Take by mouth as needed.           Past Medical History:   Diagnosis Date    Abdominal distention     Abdominal pain 2010    On & off cramping    Arthritis     Bloating     Constipation     Decorative tattoo 2/2015    Diarrhea, unspecified     Easy bruising     Endometriosis 9/2018    Fatigue     Flatulence/gas pain/belching     Headache disorder 2/2021    Heartburn     Hemorrhoids     Indigestion     Irregular bowel habits     Itch of skin     Hands,    Migraine     Osteoarthritis     Pain in joints     Pain with bowel movements     Sleep disturbance     Stress     Visit for screening mammogram 11/18/2011    Visual impairment     contacts and glasses    Wears glasses       Past Surgical History:   Procedure Laterality Date    ANTERIOR REPAIR      ENTEROCELE REPAIR      FEMUR/KNEE SURG UNLISTED Left 01/01/2006    knee surgery, arthroscopy     HYSTERECTOMY  09/06/2018    robotic bud, BS    HYSTEROSCOPY 12/1/2006    w/insert of device to occlude fallopian tubes    MIDURETHRAL SLING      OTHER SURGICAL HISTORY  1/1/1991    nose surgery, nose fx    OTHER SURGICAL HISTORY Right     knee arthroscopy x 2    OTHER SURGICAL HISTORY      hysteroscopy    POSTERIOR REPAIR      SALPINGECTOMY Bilateral 09/06/2018    TOTAL ABDOM HYSTERECTOMY  9/2018    UTEROSACRAL LIGAMENT SUSPENSION        Family History   Problem Relation Age of Onset    Arthritis Father     Colon Polyps Father     High Cholesterol Mother     Hypertension Mother     Diabetes Mother     Cancer Paternal Grandfather         colon cancer    Breast Cancer Maternal Grandmother 36      Social History:  Social History     Socioeconomic History    Marital status:    Tobacco Use    Smoking status: Former     Packs/day: 0.00     Years: 0.00     Additional pack years: 0.00     Total pack years: 0.00     Types: Cigarettes    Smokeless tobacco: Never    Tobacco comments:     When i was a teenager   Vaping Use    Vaping Use: Never used   Substance and Sexual Activity    Alcohol use: Yes     Alcohol/week: 1.0 - 2.0 standard drink of alcohol     Types: 1 - 2 Standard drinks or equivalent per week     Comment: 2-3 per month    Drug use: No         ASSESSMENT AND PLAN:       Diagnoses and all orders for this visit:    Acute conjunctivitis of both eyes, unspecified acute conjunctivitis type  -     polymyxin B-trimethoprim 92813-9.1 UNIT/ML-% Ophthalmic Solution; Place 1 drop into both eyes every 6 (six) hours for 5 days. Discussed viral vs bacterial conjunctivitis. Patient did not send images of eyes despite multiple request. Patient's report of sx seems c/w with bacterial pink eye but did disclose potential for viral pink eye and explained abx gtts would not treat this and what measures to follow then. Duration of  the service:  15 minutes      See [place] message exchange and Patient Instructions for Comfort Care and patient education.

## 2023-12-20 RX ORDER — VENLAFAXINE HYDROCHLORIDE 37.5 MG/1
37.5 CAPSULE, EXTENDED RELEASE ORAL DAILY
Qty: 90 CAPSULE | Refills: 3 | Status: SHIPPED | OUTPATIENT
Start: 2023-12-20

## 2023-12-20 RX ORDER — TRAMADOL HYDROCHLORIDE 50 MG/1
50 TABLET ORAL EVERY 6 HOURS PRN
Qty: 60 TABLET | Refills: 0 | Status: SHIPPED | OUTPATIENT
Start: 2023-12-20

## 2023-12-20 NOTE — TELEPHONE ENCOUNTER
Tramadol   DOS: N/A  Last OV: 08/14/23  Last refill date: 10/17/23     #/refills: 60/0  Upcoming appt: No future appointments.

## 2023-12-23 RX ORDER — AMLODIPINE BESYLATE 5 MG/1
5 TABLET ORAL DAILY
Qty: 90 TABLET | Refills: 3 | Status: SHIPPED | OUTPATIENT
Start: 2023-12-23 | End: 2024-12-17

## 2024-01-24 RX ORDER — AMLODIPINE BESYLATE 5 MG/1
5 TABLET ORAL DAILY
Qty: 90 TABLET | Refills: 0 | Status: SHIPPED | OUTPATIENT
Start: 2024-01-24 | End: 2025-01-18

## 2024-01-24 NOTE — TELEPHONE ENCOUNTER
Script sent to [facility]. Appointment canceled. Patient advised to continue keeping track of her blood pressures. [email or web address]. Dr Danielle/Haseeb TAM

## 2024-01-24 NOTE — TELEPHONE ENCOUNTER
Patient states she is sorry that she did not connect for the virtual visit. She is currently on her way to the airport. She said that the visit was to advise Dr Danielle the her blood pressure has been around 120s/70s the last 5 days on the medication. She said if Dr Danielle is keeping her on this medication the refill needs to go to mail order. Does she need to reschedule the appointment?

## 2024-02-17 RX ORDER — VENLAFAXINE HYDROCHLORIDE 37.5 MG/1
37.5 CAPSULE, EXTENDED RELEASE ORAL DAILY
Qty: 90 CAPSULE | Refills: 3 | Status: SHIPPED | OUTPATIENT
Start: 2024-02-17

## 2024-02-17 NOTE — TELEPHONE ENCOUNTER
venlafaxine ER 37.5 MG Oral Capsule SR 24 Hr     Psychiatric Non-Scheduled (Anti-Anxiety) Passed02/16/2024 06:24 PM   Protocol Details In person appointment or virtual visit in the past 6 mos or appointment in next 3 mos    Depression Screening completed within the past 12 months      Last office visit:  9/9/23    No future appointments.  Last filled:  12/20/23  #90 with 3 RF  Last labs:  9/9/23

## 2024-02-17 NOTE — TELEPHONE ENCOUNTER
Last refill: 10/17/23  qtY: 9 tabs  W/ 1 refills  Last ov: 09/09/23    Requested Prescriptions     Pending Prescriptions Disp Refills    SUMAtriptan Succinate (IMITREX) 100 MG Oral Tab 9 tablet 1     Sig: Take 1 tablet (100 mg total) by mouth every 2 (two) hours as needed for Migraine. Use at onset; repeat once after 2 HRS-ONLY 2 IN 24 HR MAX     No future appointments.

## 2024-02-19 RX ORDER — SUMATRIPTAN 100 MG/1
100 TABLET, FILM COATED ORAL EVERY 2 HOUR PRN
Qty: 9 TABLET | Refills: 1 | Status: SHIPPED | OUTPATIENT
Start: 2024-02-19

## 2024-07-20 RX ORDER — VENLAFAXINE HYDROCHLORIDE 37.5 MG/1
37.5 CAPSULE, EXTENDED RELEASE ORAL DAILY
Qty: 90 CAPSULE | Refills: 3 | OUTPATIENT
Start: 2024-07-20

## 2024-07-20 RX ORDER — SUMATRIPTAN 100 MG/1
100 TABLET, FILM COATED ORAL EVERY 2 HOUR PRN
Qty: 9 TABLET | Refills: 1 | Status: SHIPPED | OUTPATIENT
Start: 2024-07-20

## 2024-07-20 NOTE — TELEPHONE ENCOUNTER
Last refill: 02/19/24  Qty 9 tabs  W/ 1 refills  Last ov: 09/09/23    Requested Prescriptions     Pending Prescriptions Disp Refills    SUMAtriptan Succinate (IMITREX) 100 MG Oral Tab 9 tablet 1     Sig: Take 1 tablet (100 mg total) by mouth every 2 (two) hours as needed for Migraine. Use at onset; repeat once after 2 HRS-ONLY 2 IN 24 HR MAX     No future appointments.

## 2024-07-20 NOTE — TELEPHONE ENCOUNTER
No protocol-pt only has one left per [place] Message.    Last office visit:  09/09/2023  Last refill:  02/17/24  #90, 3 refills    No future appointments.  Called patient and let her know that the medication was send in Feb for #90 with 3 refills. She will call [facility] in [place] and get a refill.

## 2024-08-14 ENCOUNTER — PATIENT MESSAGE (OUTPATIENT)
Dept: FAMILY MEDICINE CLINIC | Facility: CLINIC | Age: 49
End: 2024-08-14

## 2024-08-14 DIAGNOSIS — Z00.00 WELL ADULT EXAM: Primary | ICD-10-CM

## 2024-08-14 NOTE — TELEPHONE ENCOUNTER
From: Starla Hirsch  To: Leyla Danielle  Sent: 8/14/2024 8:17 AM CDT  Subject: Physical bloodwork     Hi, can I get an order sent to health lab for blood for physical? Also can I have my liver & kidney functions checked. I have been taking alot of ibuprofen for my knees. Just want to be careful:)     Aurelio Carcamo

## 2024-09-16 ENCOUNTER — OFFICE VISIT (OUTPATIENT)
Dept: FAMILY MEDICINE CLINIC | Facility: CLINIC | Age: 49
End: 2024-09-16
Payer: COMMERCIAL

## 2024-09-16 ENCOUNTER — LABORATORY ENCOUNTER (OUTPATIENT)
Dept: LAB | Age: 49
End: 2024-09-16
Attending: INTERNAL MEDICINE
Payer: COMMERCIAL

## 2024-09-16 VITALS
HEART RATE: 117 BPM | OXYGEN SATURATION: 96 % | TEMPERATURE: 97 F | DIASTOLIC BLOOD PRESSURE: 78 MMHG | HEIGHT: 64.5 IN | WEIGHT: 186.25 LBS | SYSTOLIC BLOOD PRESSURE: 138 MMHG | BODY MASS INDEX: 31.41 KG/M2

## 2024-09-16 DIAGNOSIS — Z00.00 WELL ADULT EXAM: ICD-10-CM

## 2024-09-16 DIAGNOSIS — Z00.00 WELL ADULT EXAM: Primary | ICD-10-CM

## 2024-09-16 LAB
ALBUMIN SERPL-MCNC: 4.1 G/DL (ref 3.2–4.8)
ALBUMIN/GLOB SERPL: 1.2 {RATIO} (ref 1–2)
ALP LIVER SERPL-CCNC: 85 U/L
ALT SERPL-CCNC: 13 U/L
ANION GAP SERPL CALC-SCNC: 8 MMOL/L (ref 0–18)
AST SERPL-CCNC: 16 U/L (ref ?–34)
BASOPHILS # BLD AUTO: 0.03 X10(3) UL (ref 0–0.2)
BASOPHILS NFR BLD AUTO: 0.3 %
BILIRUB SERPL-MCNC: 0.4 MG/DL (ref 0.3–1.2)
BUN BLD-MCNC: 10 MG/DL (ref 9–23)
CALCIUM BLD-MCNC: 9.4 MG/DL (ref 8.7–10.4)
CHLORIDE SERPL-SCNC: 99 MMOL/L (ref 98–112)
CHOLEST SERPL-MCNC: 180 MG/DL (ref ?–200)
CO2 SERPL-SCNC: 30 MMOL/L (ref 21–32)
CREAT BLD-MCNC: 0.78 MG/DL
EGFRCR SERPLBLD CKD-EPI 2021: 94 ML/MIN/1.73M2 (ref 60–?)
EOSINOPHIL # BLD AUTO: 0.15 X10(3) UL (ref 0–0.7)
EOSINOPHIL NFR BLD AUTO: 1.4 %
ERYTHROCYTE [DISTWIDTH] IN BLOOD BY AUTOMATED COUNT: 13 %
FASTING PATIENT LIPID ANSWER: NO
FASTING STATUS PATIENT QL REPORTED: NO
GLOBULIN PLAS-MCNC: 3.3 G/DL (ref 2–3.5)
GLUCOSE BLD-MCNC: 88 MG/DL (ref 70–99)
HCT VFR BLD AUTO: 35.5 %
HDLC SERPL-MCNC: 39 MG/DL (ref 40–59)
HGB BLD-MCNC: 12.4 G/DL
IMM GRANULOCYTES # BLD AUTO: 0.04 X10(3) UL (ref 0–1)
IMM GRANULOCYTES NFR BLD: 0.4 %
LDLC SERPL CALC-MCNC: 119 MG/DL (ref ?–100)
LYMPHOCYTES # BLD AUTO: 1.92 X10(3) UL (ref 1–4)
LYMPHOCYTES NFR BLD AUTO: 17.3 %
MCH RBC QN AUTO: 29.3 PG (ref 26–34)
MCHC RBC AUTO-ENTMCNC: 34.9 G/DL (ref 31–37)
MCV RBC AUTO: 83.9 FL
MONOCYTES # BLD AUTO: 0.83 X10(3) UL (ref 0.1–1)
MONOCYTES NFR BLD AUTO: 7.5 %
NEUTROPHILS # BLD AUTO: 8.12 X10 (3) UL (ref 1.5–7.7)
NEUTROPHILS # BLD AUTO: 8.12 X10(3) UL (ref 1.5–7.7)
NEUTROPHILS NFR BLD AUTO: 73.1 %
NONHDLC SERPL-MCNC: 141 MG/DL (ref ?–130)
OSMOLALITY SERPL CALC.SUM OF ELEC: 282 MOSM/KG (ref 275–295)
PLATELET # BLD AUTO: 280 10(3)UL (ref 150–450)
POTASSIUM SERPL-SCNC: 2.8 MMOL/L (ref 3.5–5.1)
PROT SERPL-MCNC: 7.4 G/DL (ref 5.7–8.2)
RBC # BLD AUTO: 4.23 X10(6)UL
SODIUM SERPL-SCNC: 137 MMOL/L (ref 136–145)
TRIGL SERPL-MCNC: 124 MG/DL (ref 30–149)
VLDLC SERPL CALC-MCNC: 22 MG/DL (ref 0–30)
WBC # BLD AUTO: 11.1 X10(3) UL (ref 4–11)

## 2024-09-16 PROCEDURE — 87077 CULTURE AEROBIC IDENTIFY: CPT

## 2024-09-16 PROCEDURE — 87186 SC STD MICRODIL/AGAR DIL: CPT

## 2024-09-16 PROCEDURE — 87086 URINE CULTURE/COLONY COUNT: CPT

## 2024-09-16 PROCEDURE — 87491 CHLMYD TRACH DNA AMP PROBE: CPT

## 2024-09-16 PROCEDURE — 36415 COLL VENOUS BLD VENIPUNCTURE: CPT

## 2024-09-16 PROCEDURE — 80061 LIPID PANEL: CPT

## 2024-09-16 PROCEDURE — 80053 COMPREHEN METABOLIC PANEL: CPT

## 2024-09-16 PROCEDURE — 87591 N.GONORRHOEAE DNA AMP PROB: CPT

## 2024-09-16 PROCEDURE — 99396 PREV VISIT EST AGE 40-64: CPT | Performed by: INTERNAL MEDICINE

## 2024-09-16 PROCEDURE — 85025 COMPLETE CBC W/AUTO DIFF WBC: CPT

## 2024-09-16 RX ORDER — VENLAFAXINE HYDROCHLORIDE 75 MG/1
75 CAPSULE, EXTENDED RELEASE ORAL DAILY
Qty: 90 CAPSULE | Refills: 3 | Status: SHIPPED | OUTPATIENT
Start: 2024-09-16 | End: 2025-09-11

## 2024-09-16 NOTE — PROGRESS NOTES
HPI:   Starla Hirsch is a 48 year old female who presents for a complete physical exam. Symptoms: periods are regular, is menopausal. Patient complains of urinary retention, which happens when she has a UTI.  She has easy bruising.  No alcohol.  Total hysterectomy.  She has no incontinence.  She needs a mammogram. She ended her 3rd marriage and working for [employer] now; apartment living for now.     Immunization History   Administered Date(s) Administered    Covid-19 Vaccine Moderna 100 mcg/0.5 ml 01/04/2021, 02/01/2021    FLULAVAL 6 months & older 0.5 ml Prefilled syringe ([id number]) 10/06/2021    Influenza 11/30/2020    MMR 09/12/2012    Meningococcal-Menactra 09/12/2013    TDAP 05/04/2020    Tb Intradermal Test 03/31/2017     Wt Readings from Last 6 Encounters:   09/16/24 186 lb 4 oz (84.5 kg)   09/09/23 186 lb 8 oz (84.6 kg)   08/14/23 190 lb (86.2 kg)   05/05/22 190 lb (86.2 kg)   04/11/22 189 lb 9.6 oz (86 kg)   03/07/22 191 lb (86.6 kg)     Body mass index is 31.48 kg/m².     Lab Results   Component Value Date    GLU 97 09/09/2023     (H) 05/05/2022    GLU 94 09/09/2020     Lab Results   Component Value Date    CHOLEST 196 07/03/2023    CHOLEST 152 09/09/2020    CHOLEST 174 01/30/2014     Lab Results   Component Value Date    HDL 73 (H) 07/03/2023    HDL 48 09/09/2020    HDL 45 01/30/2014     Lab Results   Component Value Date     (H) 07/03/2023    LDL 76 09/09/2020     01/30/2014     Lab Results   Component Value Date    AST 9 (L) 09/09/2023    AST 19 05/05/2022    AST 14 (L) 09/09/2020     Lab Results   Component Value Date    ALT 21 09/09/2023    ALT 21 05/05/2022    ALT 15 09/09/2020       Current Outpatient Medications   Medication Sig Dispense Refill    SUMAtriptan Succinate (IMITREX) 100 MG Oral Tab Take 1 tablet (100 mg total) by mouth every 2 (two) hours as needed for Migraine. Use at onset; repeat once after 2 HRS-ONLY 2 IN 24 HR MAX 9 tablet 1    venlafaxine ER 37.5 MG  Oral Capsule SR 24 Hr Take 1 capsule (37.5 mg total) by mouth daily. 90 capsule 3    amLODIPine 5 MG Oral Tab Take 1 tablet (5 mg total) by mouth daily. 90 tablet 0    traMADol 50 MG Oral Tab Take 1 tablet (50 mg total) by mouth every 6 (six) hours as needed for Pain. 60 tablet 0    estradiol (ESTRACE) 0.1 MG/GM Vaginal Cream Apply 1/2 gram vaginally 2 times per week. 42 g 3    ibuprofen 600 MG Oral Tab Take 1 tablet (600 mg total) by mouth every 6 (six) hours as needed. 30 tablet 3    Multiple Vitamins-Minerals (MULTI COMPLETE/IRON) Oral Tab Take by mouth daily.        Loratadine-Pseudoephedrine (CLARITIN-D 12 HOUR OR) Take by mouth as needed.          Past Medical History:    Abdominal distention    Abdominal pain    On & off cramping    Arthritis    Bloating    Constipation    Decorative tattoo    Diarrhea, unspecified    Easy bruising    Endometriosis    Fatigue    Flatulence/gas pain/belching    Headache disorder    Heartburn    Hemorrhoids    Indigestion    Irregular bowel habits    Itch of skin    Hands,    Migraine    Osteoarthritis    Pain in joints    Pain with bowel movements    Sleep disturbance    Stress    Visit for screening mammogram    Visual impairment    contacts and glasses    Wears glasses      Past Surgical History:   Procedure Laterality Date    Anterior repair      Enterocele repair      Femur/knee surg unlisted Left 01/01/2006    knee surgery, arthroscopy     Hysterectomy  09/06/2018    robotic bud, ROBERT    Hysteroscopy  12/1/2006    w/insert of device to occlude fallopian tubes    Midurethral sling      Other surgical history  1/1/1991    nose surgery, nose fx    Other surgical history Right     knee arthroscopy x 2    Other surgical history      hysteroscopy    Posterior repair      Salpingectomy Bilateral 09/06/2018    Total abdom hysterectomy  9/2018    Uterosacral ligament suspension        Family History   Problem Relation Age of Onset    Arthritis Father     Colon Polyps Father      High Cholesterol Mother     Hypertension Mother     Diabetes Mother     Cancer Paternal Grandfather         colon cancer    Breast Cancer Maternal Grandmother 40      Social History:   Social History     Socioeconomic History    Marital status:    Tobacco Use    Smoking status: Former     Current packs/day: 0.00     Types: Cigarettes    Smokeless tobacco: Never    Tobacco comments:     When i was a teenager   Vaping Use    Vaping status: Never Used   Substance and Sexual Activity    Alcohol use: Yes     Alcohol/week: 1.0 - 2.0 standard drink of alcohol     Types: 1 - 2 Standard drinks or equivalent per week     Comment: 2-3 per month    Drug use: No     Social Determinants of Health      [facility]    Housing Stability     Occ: nursing. : . Children: yes.   Exercise: minimal.  Diet: doesn't watch     REVIEW OF SYSTEMS:   GENERAL: feels well otherwise  SKIN: denies any unusual skin lesions  EYES:denies blurred vision or double vision  HEENT: denies nasal congestion, sinus pain or ST  LUNGS: denies shortness of breath with exertion  CARDIOVASCULAR: denies chest pain on exertion  GI: denies abdominal pain,denies heartburn  : denies dysuria, vaginal discharge or itching,periods regular   MUSCULOSKELETAL: denies back pain  NEURO: denies headaches  PSYCHE: denies depression or anxiety  HEMATOLOGIC: denies hx of anemia  ENDOCRINE: denies thyroid history  ALL/ASTHMA: denies hx of allergy or asthma    EXAM:   /78 (BP Location: Left arm, Patient Position: Sitting, Cuff Size: adult)   Pulse 117   Temp 97.1 °F (36.2 °C) (Temporal)   Ht 5' 4.5\" (1.638 m)   Wt 186 lb 4 oz (84.5 kg)   LMP 08/20/2018   SpO2 96%   BMI 31.48 kg/m²   Body mass index is 31.48 kg/m².   GENERAL: well developed, well nourished,in no apparent distress  SKIN: no rashes,no suspicious lesions  HEENT: atraumatic, normocephalic,ears and throat are  clear  EYES:PERRLA, EOMI, normal optic disk,conjunctiva are clear  NECK: supple,no adenopathy,no bruits  CHEST: no chest tenderness  BREAST: no dominant or suspicious mass  LUNGS: clear to auscultation  CARDIO: RRR without murmur  GI: good BS's,no masses, HSM or tenderness  :introitus is normal,scant discharge,cervix is pink,no adnexal masses or tenderness, PAP was done   RECTAL:good rectal tone, stool is OB negative  MUSCULOSKELETAL: back is not tender,FROM of the back  EXTREMITIES: no cyanosis, clubbing or edema  NEURO: Oriented times three,cranial nerves are intact,motor and sensory are grossly intact    ASSESSMENT AND PLAN:   Starla Hirsch is a 48 year old female who presents for a complete physical exam. Order put in for mammogram. Self breast exam explained. Health maintenance, will check fasting Lipids, CMP, and CBC and urine culture; start CIPRO. Pt' s weight is Body mass index is 31.48 kg/m²., recommended low fat diet and aerobic exercise 30 minutes three times weekly.  The patient indicates understanding of these issues and agrees to the plan.  The patient is asked to return for CPX in 1 year.

## 2024-09-17 ENCOUNTER — TELEPHONE (OUTPATIENT)
Dept: FAMILY MEDICINE CLINIC | Facility: CLINIC | Age: 49
End: 2024-09-17

## 2024-09-17 ENCOUNTER — PATIENT MESSAGE (OUTPATIENT)
Dept: FAMILY MEDICINE CLINIC | Facility: CLINIC | Age: 49
End: 2024-09-17

## 2024-09-17 DIAGNOSIS — R63.5 WEIGHT GAIN: Primary | ICD-10-CM

## 2024-09-17 DIAGNOSIS — E87.6 LOW BLOOD POTASSIUM: Primary | ICD-10-CM

## 2024-09-17 DIAGNOSIS — N12 PYELONEPHRITIS: ICD-10-CM

## 2024-09-17 LAB
C TRACH DNA SPEC QL NAA+PROBE: NEGATIVE
N GONORRHOEA DNA SPEC QL NAA+PROBE: NEGATIVE

## 2024-09-17 RX ORDER — CIPROFLOXACIN 500 MG/1
500 TABLET, FILM COATED ORAL 2 TIMES DAILY
Qty: 20 TABLET | Refills: 0 | Status: SHIPPED | OUTPATIENT
Start: 2024-09-17 | End: 2024-09-27

## 2024-09-17 NOTE — TELEPHONE ENCOUNTER
From: Starla Hirsch  To: Leyla Danielle  Sent: 9/17/2024 9:09 AM CDT  Subject: Blood work & UTI    Hi,   I didn't have diarrhea last week. I didn't see any antibiotics scheduled for the UTI? It is waaay worse today & I have pain & spasms in the area where my kidneys are.   I took some AZO this morning. But I definitely need an antibiotic. Thanks    normal gait and station , no tenderness or deformities present

## 2024-09-17 NOTE — TELEPHONE ENCOUNTER
On call provider received page [phone], critical potassium of 2.8 relayed from Kit from laboratory department.    Seen yesterday in office by PCP    BP Readings from Last 2 Encounters:   09/16/24 138/78   09/09/23 136/78     Pulse Readings from Last 2 Encounters:   09/16/24 117   09/09/23 84

## 2024-10-10 DIAGNOSIS — M17.0 PRIMARY OSTEOARTHRITIS OF BOTH KNEES: Primary | ICD-10-CM

## 2024-10-10 RX ORDER — SUMATRIPTAN 100 MG/1
TABLET, FILM COATED ORAL
Qty: 9 TABLET | Refills: 1 | Status: SHIPPED | OUTPATIENT
Start: 2024-10-10

## 2024-10-10 RX ORDER — TRAMADOL HYDROCHLORIDE 50 MG/1
50 TABLET ORAL EVERY 6 HOURS PRN
Qty: 60 TABLET | Refills: 0 | Status: SHIPPED | OUTPATIENT
Start: 2024-10-10

## 2024-10-10 NOTE — TELEPHONE ENCOUNTER
Last refill: 07/20/24  Qty: 9 tabs  W/ 1 refills  Last ov: 09/16/24    Requested Prescriptions     Pending Prescriptions Disp Refills    SUMATRIPTAN SUCCINATE 100 MG Oral Tab [Pharmacy Med Name: SUMATRIPTAN SUCC 100 MG TABLET] 9 tablet 1     Sig: TAKE 1 TAB EVERY 2 HRS AS NEEDED FOR MIGRAINE. USE AT ONSET REPEAT ONCE AFTER 2 HRS- 2 IN 24 HR MAX     No future appointments.

## 2024-10-10 NOTE — TELEPHONE ENCOUNTER
Tramadol 50 mg  DOS: 2/24/17 (Dr. Hirsch)  Last OV: 08/14/23  Last refill date: 12/20/23     #/refills: 60/0  Upcoming appt: No future appointments.

## 2024-10-15 RX ORDER — POTASSIUM CHLORIDE 1500 MG/1
20 TABLET, EXTENDED RELEASE ORAL DAILY
Qty: 30 TABLET | Refills: 0 | Status: SHIPPED | OUTPATIENT
Start: 2024-10-15 | End: 2024-11-14

## 2024-10-22 ENCOUNTER — TELEPHONE (OUTPATIENT)
Dept: UROLOGY | Facility: CLINIC | Age: 49
End: 2024-10-22

## 2024-10-28 ENCOUNTER — OFFICE VISIT (OUTPATIENT)
Dept: UROLOGY | Facility: CLINIC | Age: 49
End: 2024-10-28
Attending: OBSTETRICS & GYNECOLOGY
Payer: COMMERCIAL

## 2024-10-28 VITALS — RESPIRATION RATE: 18 BRPM | HEIGHT: 64.5 IN | BODY MASS INDEX: 31 KG/M2 | TEMPERATURE: 98 F

## 2024-10-28 DIAGNOSIS — N95.2 POSTMENOPAUSAL ATROPHIC VAGINITIS: ICD-10-CM

## 2024-10-28 DIAGNOSIS — R82.90 MALODOROUS URINE: ICD-10-CM

## 2024-10-28 DIAGNOSIS — N81.84 PELVIC MUSCLE WASTING: ICD-10-CM

## 2024-10-28 DIAGNOSIS — N89.8 VAGINAL DISCHARGE: ICD-10-CM

## 2024-10-28 DIAGNOSIS — Z98.890 POST-OPERATIVE STATE: ICD-10-CM

## 2024-10-28 DIAGNOSIS — R39.14 FEELING OF INCOMPLETE BLADDER EMPTYING: Primary | ICD-10-CM

## 2024-10-28 DIAGNOSIS — N95.1 PERIMENOPAUSAL VASOMOTOR SYMPTOMS: ICD-10-CM

## 2024-10-28 LAB
BILIRUB UR QL STRIP.AUTO: NEGATIVE
CLARITY UR REFRACT.AUTO: CLEAR
CONTROL RUN WITHIN 24 HOURS?: YES
GLUCOSE UR STRIP.AUTO-MCNC: NORMAL MG/DL
KETONES UR STRIP.AUTO-MCNC: NEGATIVE MG/DL
LEUKOCYTE ESTERASE UR QL STRIP.AUTO: NEGATIVE
NITRITE UR QL STRIP.AUTO: NEGATIVE
NITRITE URINE: NEGATIVE
PH UR STRIP.AUTO: 6.5 [PH] (ref 5–8)
PROT UR STRIP.AUTO-MCNC: NEGATIVE MG/DL
RBC UR QL AUTO: NEGATIVE
SP GR UR STRIP.AUTO: 1 (ref 1–1.03)
UROBILINOGEN UR STRIP.AUTO-MCNC: NORMAL MG/DL

## 2024-10-28 PROCEDURE — 51701 INSERT BLADDER CATHETER: CPT | Performed by: PHYSICIAN ASSISTANT

## 2024-10-28 PROCEDURE — 81514 NFCT DS BV&VAGINITIS DNA ALG: CPT | Performed by: PHYSICIAN ASSISTANT

## 2024-10-28 PROCEDURE — 81002 URINALYSIS NONAUTO W/O SCOPE: CPT | Performed by: PHYSICIAN ASSISTANT

## 2024-10-28 PROCEDURE — 99212 OFFICE O/P EST SF 10 MIN: CPT

## 2024-10-28 PROCEDURE — 81003 URINALYSIS AUTO W/O SCOPE: CPT | Performed by: PHYSICIAN ASSISTANT

## 2024-10-28 RX ORDER — ESTRADIOL 0.03 MG/D
1 PATCH TRANSDERMAL WEEKLY
Qty: 4 EACH | Refills: 11 | Status: SHIPPED | OUTPATIENT
Start: 2024-10-28

## 2024-10-28 NOTE — PROGRESS NOTES
She is s/p robotic assisted total hyst, BS, USLS, APE repair, MUS, cysto, labial revision on 9/6/18 with Dr. Cardoso    Reports feeling of incomplete bladder emptying and malodorous urine x 1 month    Reports bothersome perimenopausal sx including hot flashes, night sweats, joint pain.  Interested in HRT    Using vag estrogen 2x week (resumed use two weeks ago)    Denies UUI, GINA  Denies gross hematuria  Denies bulge sx    Bowels reg  Denies dyspareunia    Urine Hx:  9/16/24 >100k e coli, tx with cipro 500 mg BID x 14d    Urogynecology Summary:  Urogynecology Summary  Prolapse: No  GINA: No  Urge Incontinence: No  Nocturia Frequency: 3 (2-3 times)  Frequency: 2 - 3 hours  Incomplete emptying: No  Constipation: No  Activities are limited by UI/POP?: No  Currently Sexually Active: Yes    Vitals:  Temp 98 °F (36.7 °C)   Resp 18   Ht 64.5\"   LMP 08/20/2018   BMI 31.48 kg/m²     GENERAL EXAM:  GENERAL: alert & oriented, NAD  HEENT: NC/AT, sclera without injection  SKIN: no rashes  LUNGS:  without increased respiratory effort  ABDOMEN: soft, non-tender, non-distended, no masses appreciated  EXT: no edema    PELVIC EXAM: EDITH Villanueva, present for exam as a chaperone  tolerated vaginal exam. Sutures lines well healed.  Scant discharge, swab collected.  No active bleeding. Good support.  No mesh exposure.    PELVIS FLOOR NEUROMUSCULAR FUNCTION:  Perineal Sensation:  Normal  Strength: 2/5    Pt voided 390 mL in the privacy of the bathroom  After obtaining verbal consent from the patient, sterile technique used to prep urethra and collect urine.  PVR 25 mL.  Sent for culture.      Impression/Plan:    ICD-10-CM    1. Feeling of incomplete bladder emptying  R39.14 Urine Culture, Routine     POCT urinalysis dipstick[00844]     Straight Cath PVR     Urinalysis, Routine      2. Malodorous urine  R82.90 Urine Culture, Routine     POCT urinalysis dipstick[[id number]]     Straight Cath PVR     Urinalysis, Routine      3. Vaginal  discharge  N89.8 Vaginitis Vaginosis PCR Panel      4. Post-operative state  Z98.890       5. Postmenopausal atrophic vaginitis  N95.2       6. Pelvic muscle wasting  N81.84       7. Perimenopausal vasomotor symptoms  N95.1 estradiol 0.025 MG/24HR Transdermal Patch Weekly          Discussion Items:   Discussed mgmt of vulvovaginal atrophy with vaginal estrogen cream. Reviewed associated benefits, risks, alternatives, and goals. Recommend low dose 2x/week treatment.    Discussed mgmt of perimenopausal / menopausal VMS with lifestyle modification, nonhormonal therapies, oral vs transdermal HRT including associated benefits, risks, alternatives, and goals. Recommend low dose transdermal estrogen patch (remove & replace in different site weekly), will titrate up PRN.    Treatment Plan:   Urine collected via straight cath & sent for Cx, follow result, tx if +  Follow vaginitis swab result, tx if +  Start transdermal estrogen patch 0.025 mg/24hr weekly  Continue low dose vag estrogen 1/2 g twice weekly  Discuss systemic HRT with PCP (rec transdermal estrogen)  Call with s/sx of UTI  Plan for follow up in 2 months for VMS (phone) sooner prn    All questions answered  She understands and agrees to plan    Rere Echevarria PA-C    Note to patient: The 21st Century Cures Act makes medical notes like these available to patients in the interest of transparency.  However, be advised this is a medical document.  It is intended as peer to peer communication.  It is written in medical language and may contain abbreviations or verbiage that are unfamiliar.  It may appear blunt or direct.  Medical documents are intended to carry relevant information, facts as evident, and the clinical opinion of the practitioner.

## 2024-10-29 ENCOUNTER — TELEPHONE (OUTPATIENT)
Dept: UROLOGY | Facility: CLINIC | Age: 49
End: 2024-10-29

## 2024-10-29 LAB
BV BACTERIA DNA VAG QL NAA+PROBE: NEGATIVE
C GLABRATA DNA VAG QL NAA+PROBE: NEGATIVE
C KRUSEI DNA VAG QL NAA+PROBE: NEGATIVE
CANDIDA DNA VAG QL NAA+PROBE: POSITIVE
T VAGINALIS DNA VAG QL NAA+PROBE: NEGATIVE

## 2024-10-29 RX ORDER — FLUCONAZOLE 150 MG/1
150 TABLET ORAL
Qty: 2 TABLET | Refills: 0 | Status: SHIPPED | OUTPATIENT
Start: 2024-10-29 | End: 2024-11-02

## 2024-10-31 RX ORDER — NITROFURANTOIN 25; 75 MG/1; MG/1
100 CAPSULE ORAL 2 TIMES DAILY
Qty: 14 CAPSULE | Refills: 0 | Status: SHIPPED | OUTPATIENT
Start: 2024-10-31

## 2024-10-31 NOTE — TELEPHONE ENCOUNTER
TC to pt, LM on VM  UCx + for e coli   TORB MICHAEL Pierre-Start Macrobid 1 tab BID x 7 days   Call office if sx do not improve   Request call back to verify messages.

## 2024-11-04 ENCOUNTER — TELEPHONE (OUTPATIENT)
Dept: UROLOGY | Facility: CLINIC | Age: 49
End: 2024-11-04

## 2024-11-04 DIAGNOSIS — N39.0 FREQUENT UTI: Primary | ICD-10-CM

## 2024-11-04 RX ORDER — NITROFURANTOIN MACROCRYSTALS 100 MG/1
CAPSULE ORAL
Qty: 90 CAPSULE | Refills: 0 | Status: SHIPPED | OUTPATIENT
Start: 2024-11-04

## 2024-11-04 NOTE — TELEPHONE ENCOUNTER
TC to pt to discuss frequent UTIs    UCx:  9/16/24 >100k e coli, tx with cipro 500 mg BID x 14d  10/28/24 10-50k e coli, tx with Macrobid x 7d    Reports sx improving    New sexual partner, reports increase in frequency of intercourse as possible UTI trigger    Discussed management of recurrent urinary tract infections. Discussed use of pharmacologic treatment options for suppression therapy. Risks vs benefits reviewed with patient.     REC:  Start Macrodantin 100 mg with coitus -- start after completion of current Macrobid Rx  Continue vag estrogen 2x weekly  Continue transdermal estrogen for VMS    F/u visit in 3 months for frequent UTIs and VMS

## 2024-11-05 ENCOUNTER — TELEPHONE (OUTPATIENT)
Dept: UROLOGY | Facility: CLINIC | Age: 49
End: 2024-11-05

## 2024-11-24 DIAGNOSIS — M17.0 PRIMARY OSTEOARTHRITIS OF BOTH KNEES: ICD-10-CM

## 2024-11-25 RX ORDER — TRAMADOL HYDROCHLORIDE 50 MG/1
50 TABLET ORAL EVERY 6 HOURS PRN
Qty: 60 TABLET | Refills: 0 | OUTPATIENT
Start: 2024-11-25

## 2024-11-25 NOTE — TELEPHONE ENCOUNTER
Tramadol    DOS: n/a  Last OV: 8/14/23  Last refill date: 10/10/24 #/refills: 60/0  Upcoming appt: none     Refill not appropriate

## 2025-01-24 ENCOUNTER — PATIENT MESSAGE (OUTPATIENT)
Dept: FAMILY MEDICINE CLINIC | Facility: CLINIC | Age: 50
End: 2025-01-24

## 2025-01-25 NOTE — TELEPHONE ENCOUNTER
Virtual Telephone Check-In    Starla Hirsch verbally {consents to/declines (Can be done by front staff):9909} a Virtual/Telephone Check-In visit on 01/24/25.  Patient has been referred to the [facility] website at [email or web address] to review the yearly Consent to Treat document.    Patient understands and accepts financial responsibility for any deductible, co-insurance and/or co-pays associated with this service.    Duration of the service: *** minutes      Summary of topics discussed:       {Assessment and Plan Smarlist and follow up recs (Optional):7622}      Leyla Danielle MD

## 2025-02-18 ENCOUNTER — TELEPHONE (OUTPATIENT)
Facility: CLINIC | Age: 50
End: 2025-02-18

## 2025-02-18 DIAGNOSIS — M25.561 PAIN IN BOTH KNEES, UNSPECIFIED CHRONICITY: Primary | ICD-10-CM

## 2025-02-18 DIAGNOSIS — M25.562 PAIN IN BOTH KNEES, UNSPECIFIED CHRONICITY: Primary | ICD-10-CM

## 2025-02-18 NOTE — TELEPHONE ENCOUNTER
Patient scheduled online for \"Both knee arthritis, xrays & injections  \"   Patient was last seen in 8/2023   Please advise if new imaging is needed.   Future Appointments   Date Time Provider Department Center   2/21/2025 11:40 AM Laci Fraire PA-C [facility]        Bill For Treatment Device Design?: Yes - Please Ensure You Have Selected The Appropriate Items in the Simulation/TDD Tab

## 2025-02-18 NOTE — TELEPHONE ENCOUNTER
Bilateral weightbearing xrays ordered and MyChart message sent.  Future Appointments   Date Time Provider Department Center   2/21/2025 11:05 AM [facility] [place]   2/21/2025 11:10 AM [facility] XR RM1 [facility] [place]   2/21/2025 11:40 AM Laci Fraire PA-C [facility]

## 2025-02-21 ENCOUNTER — HOSPITAL ENCOUNTER (OUTPATIENT)
Dept: GENERAL RADIOLOGY | Age: 50
Discharge: HOME | End: 2025-02-21
Attending: PHYSICIAN ASSISTANT
Payer: COMMERCIAL

## 2025-02-21 ENCOUNTER — OFFICE VISIT (OUTPATIENT)
Dept: ORTHOPEDICS CLINIC | Facility: CLINIC | Age: 50
End: 2025-02-21
Payer: COMMERCIAL

## 2025-02-21 DIAGNOSIS — M25.561 PAIN IN BOTH KNEES, UNSPECIFIED CHRONICITY: ICD-10-CM

## 2025-02-21 DIAGNOSIS — M25.562 PAIN IN BOTH KNEES, UNSPECIFIED CHRONICITY: ICD-10-CM

## 2025-02-21 DIAGNOSIS — M17.0 PRIMARY OSTEOARTHRITIS OF BOTH KNEES: ICD-10-CM

## 2025-02-21 PROCEDURE — 73564 X-RAY EXAM KNEE 4 OR MORE: CPT | Performed by: PHYSICIAN ASSISTANT

## 2025-02-21 PROCEDURE — 20610 DRAIN/INJ JOINT/BURSA W/O US: CPT | Performed by: PHYSICIAN ASSISTANT

## 2025-02-21 PROCEDURE — 99213 OFFICE O/P EST LOW 20 MIN: CPT | Performed by: PHYSICIAN ASSISTANT

## 2025-02-21 RX ORDER — TRAMADOL HYDROCHLORIDE 50 MG/1
50 TABLET ORAL EVERY 6 HOURS PRN
Qty: 60 TABLET | Refills: 0 | Status: SHIPPED | OUTPATIENT
Start: 2025-02-21

## 2025-02-21 RX ORDER — TRIAMCINOLONE ACETONIDE 40 MG/ML
80 INJECTION, SUSPENSION INTRA-ARTICULAR; INTRAMUSCULAR ONCE
Status: COMPLETED | OUTPATIENT
Start: 2025-02-21 | End: 2025-02-21

## 2025-02-21 RX ADMIN — TRIAMCINOLONE ACETONIDE 80 MG: 40 INJECTION, SUSPENSION INTRA-ARTICULAR; INTRAMUSCULAR at 11:43:00

## 2025-02-21 NOTE — PROGRESS NOTES
SURGERY SCHEDULING SHEET    Starla Hirsch  10/24/1975  [id number]    Procedure: Right total knee arthroplasty    CPT: 46705    Diagnosis: Right knee osteoarthritis    Anesthesia: Choice    Length of Surgery: 2 hours    Disposition: Inpatient    Instruments: Medacta TKA    Assist: If case scheduled on Thurs/Fri, then Laci Fraire first assist. If case scheduled on Tues, then Junito [place] ([phone]) first assist. If Junito unavailable, then please communicate to Laci Fraire/Renetta/Ashley to cancel Laci's clinic for that day and have Laci first assist. If Laci unavailable, contact Richmond Hanks for first assist. If Junito and Richmond unavailable, then contact [facility] for first assist.    Pre-op Testing: CBC, CMP, PT/INR, PTT, TYPE AND SCREEN, and MRSA/MSSA THROUGH EDW PAT    Clearance: MEDICAL/PCP    Post op: 2 weeks postop appointment with Laci Fraire    Surgery date specifics: Next available    Laci Fraire PA-C  [facility] Orthopedic Surgery  Phone: [phone]  Fax: [phone]

## 2025-02-21 NOTE — PROGRESS NOTES
SURGERY SCHEDULING SHEET    Starla Hirsch  10/24/1975  [id number]    Procedure: Left total knee arthroplasty    CPT: 72515    Diagnosis: Left knee osteoarthritis    Anesthesia: Choice    Length of Surgery: 2 hours    Disposition: Inpatient    Instruments: Medacta TKA    Assist: If case scheduled on Thurs/Fri, then Laci Fraire first assist. If case scheduled on Tues, then Junito [place] ([phone]) first assist. If Junito unavailable, then please communicate to Laci Fraire/Renetta/Ashley to cancel Laci's clinic for that day and have Laci first assist. If Laci unavailable, contact Richmond Hanks for first assist. If Junito and Richmond unavailable, then contact [facility] for first assist.    Pre-op Testing: CBC, CMP, PT/INR, PTT, TYPE AND SCREEN, and MRSA/MSSA THROUGH EDW PAT    Clearance: MEDICAL/PCP    Post op: 2 weeks postop appointment with Laci Fraire    Surgery date specifics: Next available    Laci Fraire PA-C  [facility] Orthopedic Surgery  Phone: [phone]  Fax: [phone]

## 2025-02-21 NOTE — ADDENDUM NOTE
Addended by: ANT MARTINEZ on: 2/21/2025 12:34 PM     Modules accepted: Orders, Level of Service

## 2025-02-21 NOTE — PROGRESS NOTES
[facility] Progress Note    Subjective: 49-year-old female returns to clinic to discuss both her knees.  She feels increased instability with the left knee but issues with both knees.  She continues to take tramadol sparingly for pain control.  Interested in repeat injections into both knees in clinic but also interested in pursuing knee replacement surgery.  She is traveling to [place] in October to celebrate her 50th birthday and would hope to be recovered by then.    Objective: Patient ambulating independently in clinic today.  Alert and oriented.  No acute distress.  Nonlabored breathing.  Grossly neurologically intact.    Imaging: Radiographs of both knees obtained today personally viewed, independently interpreted and radiology report read.  Radiographs demonstrate moderate to severe medial compartmental joint space narrowing bilaterally, osteophytic lipping along the bilateral femoral condyles.    Assessment/Plan: 49-year-old female with symptomatic radiographically moderate to severe bilateral knee osteoarthritis.  Patient feels that she has exhausted conservative measures at this point is most interested in pursuing surgical intervention.  I discussed the typical expectations with regards to knee replacement, including recovery.  Booking sheet was written.  Knee injections were administered into both knees in clinic today, please see separate procedure note for additional details.  Patient understands the 3-month delay between injections and earliest surgery date.  Binder provided to the patient in clinic today.  She will need PCP clearance.      Laci Fraire PA-C  [facility]    This note was dictated using Dragon software.  While it was briefly proofread prior to completion, some grammatical, spelling, and word choice errors due to dictation may still occur.

## 2025-02-24 ENCOUNTER — TELEPHONE (OUTPATIENT)
Dept: ORTHOPEDICS CLINIC | Facility: CLINIC | Age: 50
End: 2025-02-24

## 2025-02-24 NOTE — TELEPHONE ENCOUNTER
Date of Surgery:        Post Op Appt:      Case ID:     Notes:  Also Sandor, do you mind scheduling an appointment with Dr. Coles for surgical discussion? Next available is fine. I provided her a binder but it would help to see him.             SURGERY SCHEDULING SHEET     Starla Hirsch  10/24/1975  [id number]     Procedure: Left total knee arthroplasty     CPT: 19036     Diagnosis: Left knee osteoarthritis     Anesthesia: Choice     Length of Surgery: 2 hours     Disposition: Inpatient     Instruments: Medacta TKA     Assist: If case scheduled on Thurs/Fri, then Laci Fraire first assist. If case scheduled on Tues, then Junito [place] ([phone]) first assist. If Junito unavailable, then please communicate to Laci Fraire/Renetta/Ashley to cancel Laci's clinic for that day and have Laci first assist. If Laci unavailable, contact Richmond Hanks for first assist. If Junito and Richmond unavailable, then contact [facility] for first assist.     Pre-op Testing: CBC, CMP, PT/INR, PTT, TYPE AND SCREEN, and MRSA/MSSA THROUGH EDW PAT     Clearance: MEDICAL/PCP     Post op: 2 weeks postop appointment with Laci Fraire     Surgery date specifics: Next available     Laci Fraire PA-C  [facility] Orthopedic Surgery  Phone: [phone]  Fax: [phone]

## 2025-02-24 NOTE — PROCEDURES
Risks and benefits of knee injection discussed with the patient, with risks including but not limited to pain and swelling at the injection site and/or within the knee joint, infection, elevation in blood pressure and/or glucose levels, facial flushing. After informed consent, the patient's right and left knees were marked, locally anesthetized with skin refrigerant, prepped with topical antiseptic, and injected with a mixture of 1mL 40mg/mL Kenalog, 2mL 1% lidocaine and 2mL 0.5% marcaine through the inferolateral portal.  A band-aid was applied.  The patient tolerated the procedure well.    Laci Fraire PA-C  [facility] Orthopedic Surgery

## 2025-02-24 NOTE — TELEPHONE ENCOUNTER
Date of Surgery:        Post Op Appt:      Case ID:     Notes: Also Sandor, do you mind scheduling an appointment with Dr. Coles for surgical discussion? Next available is fine. I provided her a binder but it would help to see him.             SURGERY SCHEDULING SHEET     Starla Hirsch  10/24/1975  [id number]     Procedure: Right total knee arthroplasty     CPT: 25722     Diagnosis: Right knee osteoarthritis     Anesthesia: Choice     Length of Surgery: 2 hours     Disposition: Inpatient     Instruments: Medacta TKA     Assist: If case scheduled on Thurs/Fri, then Laci Fraire first assist. If case scheduled on Tues, then Junito Sosat[place] ([phone]) first assist. If Junito unavailable, then please communicate to Laci Fraire/Renetta/Ashley to cancel Laci's clinic for that day and have Laci first assist. If Laci unavailable, contact Richmond Hanks for first assist. If Junito and Richmond unavailable, then contact [facility] for first assist.     Pre-op Testing: CBC, CMP, PT/INR, PTT, TYPE AND SCREEN, and MRSA/MSSA THROUGH EDW PAT     Clearance: MEDICAL/PCP     Post op: 2 weeks postop appointment with Laci Fraire     Surgery date specifics: Next available     Laci Fraire PA-C  [facility] Orthopedic Surgery  Phone: [phone]  Fax: [phone]

## 2025-04-24 ENCOUNTER — PATIENT MESSAGE (OUTPATIENT)
Dept: FAMILY MEDICINE CLINIC | Facility: CLINIC | Age: 50
End: 2025-04-24

## 2025-06-09 ENCOUNTER — TELEPHONE (OUTPATIENT)
Dept: FAMILY MEDICINE CLINIC | Facility: CLINIC | Age: 50
End: 2025-06-09

## 2025-06-09 NOTE — TELEPHONE ENCOUNTER
LM for patient to CB and schedule the pre-op visit no longer than 30 daysprior to the day of the procedure

## 2025-06-16 ENCOUNTER — TELEPHONE (OUTPATIENT)
Dept: FAMILY MEDICINE CLINIC | Facility: CLINIC | Age: 50
End: 2025-06-16

## 2025-06-16 NOTE — TELEPHONE ENCOUNTER
Pre Op orders received  from Dr. Starr office at [facility], orders are in [employer].

## 2025-07-10 ENCOUNTER — OFFICE VISIT (OUTPATIENT)
Dept: FAMILY MEDICINE CLINIC | Facility: CLINIC | Age: 50
End: 2025-07-10
Payer: COMMERCIAL

## 2025-07-10 VITALS
BODY MASS INDEX: 32.1 KG/M2 | SYSTOLIC BLOOD PRESSURE: 160 MMHG | WEIGHT: 188 LBS | TEMPERATURE: 98 F | OXYGEN SATURATION: 98 % | DIASTOLIC BLOOD PRESSURE: 94 MMHG | HEIGHT: 64 IN | HEART RATE: 97 BPM

## 2025-07-10 DIAGNOSIS — I10 ESSENTIAL HYPERTENSION: ICD-10-CM

## 2025-07-10 DIAGNOSIS — M25.561 RIGHT KNEE PAIN, UNSPECIFIED CHRONICITY: ICD-10-CM

## 2025-07-10 DIAGNOSIS — Z01.818 PREOP EXAMINATION: Primary | ICD-10-CM

## 2025-07-10 PROCEDURE — 99214 OFFICE O/P EST MOD 30 MIN: CPT

## 2025-07-10 PROCEDURE — 93000 ELECTROCARDIOGRAM COMPLETE: CPT

## 2025-07-10 RX ORDER — CARVEDILOL 6.25 MG/1
6.25 TABLET ORAL 2 TIMES DAILY WITH MEALS
Qty: 60 TABLET | Refills: 0 | Status: SHIPPED | OUTPATIENT
Start: 2025-07-10 | End: 2025-07-11

## 2025-07-10 NOTE — PROGRESS NOTES
Starla Hirsch is a 49 year old female who presents for a pre-operative physical exam.     Patient is scheduled for a right total knee arthroplasty procedure at [facility] on 8/6/25 performed by Dr Sekou Huang.       She has had previous anesthesia:  Yes.    Previous complications:  No  Personal or Family History Malignant Hyperthermia: No    Respiratory Disease: none known  Cardiac Disease: none known  Sleep Apnea: none known      Short Social Hx on File[1]   Past Medical History[2]  Past Surgical History[3]  Allergies: Allergies[4]  Current Medications[5]     LABORATORY DATA:   CBC:  Lab Results   Component Value Date    WBC 11.1 (H) 09/16/2024    WBC 6.9 09/09/2023    WBC 8.2 05/05/2022     Lab Results   Component Value Date    HGB 12.4 09/16/2024    HGB 13.5 09/09/2023    HGB 14.5 05/05/2022      Lab Results   Component Value Date    .0 09/16/2024    .0 09/09/2023    .0 05/05/2022      BMP:   No results found for: \"GLUCOSE\"  Lab Results   Component Value Date    K 2.8 (LL) 09/16/2024    K 3.6 09/09/2023    K 3.2 (L) 05/05/2022     Lab Results   Component Value Date    BUN 10 09/16/2024    BUN 9 09/09/2023    BUN 9 05/05/2022     Lab Results   Component Value Date    CREATSERUM 0.78 09/16/2024    CREATSERUM 0.78 09/09/2023    CREATSERUM 0.64 05/05/2022      PT/INR:   No results found for: \"PT\", \"INR\"     REVIEW OF SYSTEMS:   Review of Systems   Constitutional:  Negative for chills, fever, malaise/fatigue and weight loss.   HENT:  Negative for congestion, sinus pain and sore throat.    Eyes:  Negative for pain and discharge.   Respiratory:  Negative for cough and shortness of breath.    Cardiovascular:  Negative for chest pain, palpitations and orthopnea.   Gastrointestinal:  Negative for abdominal pain, blood in stool, constipation, diarrhea, nausea and vomiting.   Genitourinary:  Negative for dysuria, frequency, hematuria and urgency.    Musculoskeletal:  Negative for back pain and neck pain.   Skin:  Negative for rash.   Neurological:  Negative for dizziness and headaches.        PHYSICAL EXAM:   /90 (BP Location: Left arm, Patient Position: Sitting)   Pulse 97   Temp 98.1 °F (36.7 °C) (Temporal)   Ht 5' 4\" (1.626 m)   Wt 188 lb (85.3 kg)   LMP 08/20/2018   SpO2 98%   BMI 32.27 kg/m²    Physical Exam  Vitals reviewed.   Constitutional:       General: She is not in acute distress.     Appearance: Normal appearance. She is not ill-appearing.   HENT:      Head: Atraumatic.      Right Ear: Tympanic membrane, ear canal and external ear normal.      Left Ear: Tympanic membrane, ear canal and external ear normal.      Nose: Nose normal.      Mouth/Throat:      Mouth: Mucous membranes are moist.   Eyes:      General:         Right eye: No discharge.         Left eye: No discharge.      Conjunctiva/sclera: Conjunctivae normal.      Pupils: Pupils are equal, round, and reactive to light.   Cardiovascular:      Rate and Rhythm: Normal rate and regular rhythm.      Heart sounds: Normal heart sounds.   Pulmonary:      Effort: Pulmonary effort is normal.      Breath sounds: Normal breath sounds.   Abdominal:      General: Abdomen is flat. Bowel sounds are normal.      Palpations: Abdomen is soft.   Neurological:      Mental Status: She is alert.          ASSESSMENT AND PLAN:   Starla was seen today for pre-op exam.    Diagnoses and all orders for this visit:    Preop examination  -     CBC W Differential W Platelet [E]; Future  -     Comp Metabolic Panel (14) [E]; Future    Right knee pain, unspecified chronicity         She is a good surgical candidate.   Patient's medical conditions are being optimally managed  Patient is cleared with low risk for upcoming proposed surgery  This consult was sent back the referring physician, Dr. Seoku Huang.    30 minutes were spent on assessment, education, and discussion of plan.    TAMIKA Bravo             [1]   Social History  Socioeconomic History    Marital status:    Tobacco Use    Smoking status: Former     Current packs/day: 0.00     Types: Cigarettes    Smokeless tobacco: Never    Tobacco comments:     When i was a teenager     Updated 2/21/25   Vaping Use    Vaping status: Never Used   Substance and Sexual Activity    Alcohol use: Not Currently    Drug use: Yes     Types: Cannabis     Comment: once a month     Social Drivers of Health      Received from [facility]    Housing Stability   [2]   Past Medical History:   Abdominal distention    Abdominal pain    On & off cramping    Arthritis    Bloating    Constipation    Decorative tattoo    Diarrhea, unspecified    Easy bruising    Endometriosis    Fatigue    Flatulence/gas pain/belching    Headache disorder    Heartburn    Hemorrhoids    Indigestion    Irregular bowel habits    Itch of skin    Hands,    Migraine    Osteoarthritis    Pain in joints    Pain with bowel movements    Sleep disturbance    Stress    Visit for screening mammogram    Visual impairment    contacts and glasses    Wears glasses   [3]   Past Surgical History:  Procedure Laterality Date    Anterior repair      Enterocele repair      Femur/knee surg unlisted Left 01/01/2006    knee surgery, arthroscopy     Hysterectomy  09/06/2018    robotic hyst, BS    Hysteroscopy  12/1/2006    w/insert of device to occlude fallopian tubes    Midurethral sling      Other surgical history  1/1/1991    nose surgery, nose fx    Other surgical history Right     knee arthroscopy x 2    Other surgical history      hysteroscopy    Posterior repair      Salpingectomy Bilateral 09/06/2018    Total abdom hysterectomy  9/2018    Uterosacral ligament suspension     [4] No Known Allergies  [5]   Current Outpatient Medications   Medication Sig Dispense Refill    traMADol 50 MG Oral Tab Take 1 tablet (50 mg total) by mouth every 6 (six) hours as needed for Pain. 60 tablet 0     nitrofurantoin macrocrystal (MACRODANTIN) 100 MG Oral Cap Take 1 capsule by mouth with intercourse 90 capsule 0    estradiol 0.025 MG/24HR Transdermal Patch Weekly Place 1 patch onto the skin once a week. 4 each 11    SUMATRIPTAN SUCCINATE 100 MG Oral Tab TAKE 1 TAB EVERY 2 HRS AS NEEDED FOR MIGRAINE. USE AT ONSET REPEAT ONCE AFTER 2 HRS- 2 IN 24 HR MAX 9 tablet 1    venlafaxine ER 75 MG Oral Capsule SR 24 Hr Take 1 capsule (75 mg total) by mouth daily. 90 capsule 3    estradiol (ESTRACE) 0.1 MG/GM Vaginal Cream Apply 1/2 gram vaginally 2 times per week. 42 g 3    ibuprofen 600 MG Oral Tab Take 1 tablet (600 mg total) by mouth every 6 (six) hours as needed. 30 tablet 3    Multiple Vitamins-Minerals (MULTI COMPLETE/IRON) Oral Tab Take by mouth in the morning.      Loratadine-Pseudoephedrine (CLARITIN-D 12 HOUR OR) Take by mouth as needed.

## 2025-07-11 ENCOUNTER — LABORATORY ENCOUNTER (OUTPATIENT)
Dept: LAB | Facility: HOSPITAL | Age: 50
End: 2025-07-11
Attending: ORTHOPAEDIC SURGERY
Payer: COMMERCIAL

## 2025-07-11 DIAGNOSIS — Z01.818 PRE-OP TESTING: ICD-10-CM

## 2025-07-11 LAB
ANION GAP SERPL CALC-SCNC: 1 MMOL/L (ref 0–18)
ANTIBODY SCREEN: NEGATIVE
BUN BLD-MCNC: 11 MG/DL (ref 9–23)
CALCIUM BLD-MCNC: 9.2 MG/DL (ref 8.7–10.6)
CHLORIDE SERPL-SCNC: 102 MMOL/L (ref 98–112)
CO2 SERPL-SCNC: 37 MMOL/L (ref 21–32)
CREAT BLD-MCNC: 0.8 MG/DL (ref 0.55–1.02)
EGFRCR SERPLBLD CKD-EPI 2021: 90 ML/MIN/1.73M2 (ref 60–?)
ERYTHROCYTE [DISTWIDTH] IN BLOOD BY AUTOMATED COUNT: 13 %
FASTING STATUS PATIENT QL REPORTED: YES
GLUCOSE BLD-MCNC: 97 MG/DL (ref 70–99)
HCT VFR BLD AUTO: 36.7 % (ref 35–48)
HGB BLD-MCNC: 13.1 G/DL (ref 12–16)
MCH RBC QN AUTO: 29.6 PG (ref 26–34)
MCHC RBC AUTO-ENTMCNC: 35.7 G/DL (ref 31–37)
MCV RBC AUTO: 83 FL (ref 80–100)
OSMOLALITY SERPL CALC.SUM OF ELEC: 289 MOSM/KG (ref 275–295)
PLATELET # BLD AUTO: 343 10(3)UL (ref 150–450)
POTASSIUM SERPL-SCNC: 4.3 MMOL/L (ref 3.5–5.1)
RBC # BLD AUTO: 4.42 X10(6)UL (ref 3.8–5.3)
RH BLOOD TYPE: POSITIVE
SODIUM SERPL-SCNC: 140 MMOL/L (ref 136–145)
WBC # BLD AUTO: 6.9 X10(3) UL (ref 4–11)

## 2025-07-11 PROCEDURE — 87081 CULTURE SCREEN ONLY: CPT

## 2025-07-11 PROCEDURE — 80048 BASIC METABOLIC PNL TOTAL CA: CPT

## 2025-07-11 PROCEDURE — 85027 COMPLETE CBC AUTOMATED: CPT

## 2025-07-11 PROCEDURE — 86900 BLOOD TYPING SEROLOGIC ABO: CPT

## 2025-07-11 PROCEDURE — 86850 RBC ANTIBODY SCREEN: CPT

## 2025-07-11 PROCEDURE — 36415 COLL VENOUS BLD VENIPUNCTURE: CPT

## 2025-07-11 PROCEDURE — 86901 BLOOD TYPING SEROLOGIC RH(D): CPT

## 2025-07-11 RX ORDER — CARVEDILOL 6.25 MG/1
6.25 TABLET ORAL 2 TIMES DAILY WITH MEALS
Qty: 180 TABLET | Refills: 0 | Status: SHIPPED | OUTPATIENT
Start: 2025-07-11

## 2025-07-16 ENCOUNTER — TELEPHONE (OUTPATIENT)
Dept: FAMILY MEDICINE CLINIC | Facility: CLINIC | Age: 50
End: 2025-07-16

## 2025-07-16 LAB
ATRIAL RATE: 93 BPM
P AXIS: 50 DEGREES
P-R INTERVAL: 146 MS
Q-T INTERVAL: 352 MS
QRS DURATION: 70 MS
QTC CALCULATION (BEZET): 437 MS
R AXIS: -3 DEGREES
T AXIS: 41 DEGREES
VENTRICULAR RATE: 93 BPM

## 2025-07-16 NOTE — TELEPHONE ENCOUNTER
Pre-op paperwork faxed to [facility].  fax number given [phone]  Phone number given: [phone]

## 2025-08-01 ENCOUNTER — PATIENT MESSAGE (OUTPATIENT)
Dept: FAMILY MEDICINE CLINIC | Facility: CLINIC | Age: 50
End: 2025-08-01

## 2025-08-04 RX ORDER — POTASSIUM CHLORIDE 1500 MG/1
20 TABLET, EXTENDED RELEASE ORAL DAILY
Qty: 30 TABLET | Refills: 0 | Status: SHIPPED | OUTPATIENT
Start: 2025-08-04 | End: 2025-09-03

## 2025-08-04 RX ORDER — POTASSIUM CHLORIDE 1500 MG/1
1 TABLET, EXTENDED RELEASE ORAL NIGHTLY
Qty: 60 TABLET | Refills: 0 | Status: SHIPPED | OUTPATIENT
Start: 2025-08-04

## 2025-08-05 ENCOUNTER — ANESTHESIA EVENT (OUTPATIENT)
Dept: SURGERY | Facility: HOSPITAL | Age: 50
End: 2025-08-05

## 2025-08-06 ENCOUNTER — ANESTHESIA (OUTPATIENT)
Dept: SURGERY | Facility: HOSPITAL | Age: 50
End: 2025-08-06

## 2025-08-06 ENCOUNTER — HOSPITAL ENCOUNTER (OUTPATIENT)
Facility: HOSPITAL | Age: 50
Discharge: HOME HEALTH | End: 2025-08-07
Attending: ORTHOPAEDIC SURGERY | Admitting: ORTHOPAEDIC SURGERY

## 2025-08-06 DIAGNOSIS — G89.29 CHRONIC PAIN OF RIGHT KNEE: ICD-10-CM

## 2025-08-06 DIAGNOSIS — Z01.818 PRE-OP TESTING: Primary | ICD-10-CM

## 2025-08-06 DIAGNOSIS — M25.561 CHRONIC PAIN OF RIGHT KNEE: ICD-10-CM

## 2025-08-06 PROBLEM — Z96.659 S/P KNEE REPLACEMENT: Status: ACTIVE | Noted: 2025-08-06

## 2025-08-06 LAB — POTASSIUM SERPL-SCNC: 4.5 MMOL/L (ref 3.5–5.1)

## 2025-08-06 PROCEDURE — 97161 PT EVAL LOW COMPLEX 20 MIN: CPT

## 2025-08-06 PROCEDURE — 88311 DECALCIFY TISSUE: CPT | Performed by: ORTHOPAEDIC SURGERY

## 2025-08-06 PROCEDURE — 94760 N-INVAS EAR/PLS OXIMETRY 1: CPT

## 2025-08-06 PROCEDURE — 97530 THERAPEUTIC ACTIVITIES: CPT

## 2025-08-06 PROCEDURE — 88305 TISSUE EXAM BY PATHOLOGIST: CPT | Performed by: ORTHOPAEDIC SURGERY

## 2025-08-06 PROCEDURE — 76942 ECHO GUIDE FOR BIOPSY: CPT | Performed by: ANESTHESIOLOGY

## 2025-08-06 PROCEDURE — 84132 ASSAY OF SERUM POTASSIUM: CPT | Performed by: ORTHOPAEDIC SURGERY

## 2025-08-06 PROCEDURE — 36415 COLL VENOUS BLD VENIPUNCTURE: CPT | Performed by: ORTHOPAEDIC SURGERY

## 2025-08-06 DEVICE — IMPLANTABLE DEVICE: Type: IMPLANTABLE DEVICE | Site: KNEE | Status: FUNCTIONAL

## 2025-08-06 RX ORDER — HYDROMORPHONE HYDROCHLORIDE 1 MG/ML
0.8 INJECTION, SOLUTION INTRAMUSCULAR; INTRAVENOUS; SUBCUTANEOUS EVERY 2 HOUR PRN
Status: DISCONTINUED | OUTPATIENT
Start: 2025-08-06 | End: 2025-08-07

## 2025-08-06 RX ORDER — HYDROMORPHONE HYDROCHLORIDE 1 MG/ML
0.2 INJECTION, SOLUTION INTRAMUSCULAR; INTRAVENOUS; SUBCUTANEOUS EVERY 5 MIN PRN
Status: DISCONTINUED | OUTPATIENT
Start: 2025-08-06 | End: 2025-08-06 | Stop reason: HOSPADM

## 2025-08-06 RX ORDER — ACETAMINOPHEN 500 MG
1000 TABLET ORAL 3 TIMES DAILY
Qty: 90 TABLET | Refills: 0 | Status: SHIPPED | OUTPATIENT
Start: 2025-08-06

## 2025-08-06 RX ORDER — TRANEXAMIC ACID 10 MG/ML
1000 INJECTION, SOLUTION INTRAVENOUS ONCE
Status: COMPLETED | OUTPATIENT
Start: 2025-08-06 | End: 2025-08-06

## 2025-08-06 RX ORDER — HYDROMORPHONE HYDROCHLORIDE 1 MG/ML
0.4 INJECTION, SOLUTION INTRAMUSCULAR; INTRAVENOUS; SUBCUTANEOUS EVERY 5 MIN PRN
Status: DISCONTINUED | OUTPATIENT
Start: 2025-08-06 | End: 2025-08-06 | Stop reason: HOSPADM

## 2025-08-06 RX ORDER — MIDAZOLAM HYDROCHLORIDE 1 MG/ML
INJECTION INTRAMUSCULAR; INTRAVENOUS AS NEEDED
Status: DISCONTINUED | OUTPATIENT
Start: 2025-08-06 | End: 2025-08-06 | Stop reason: SURG

## 2025-08-06 RX ORDER — HYDROMORPHONE HYDROCHLORIDE 1 MG/ML
0.4 INJECTION, SOLUTION INTRAMUSCULAR; INTRAVENOUS; SUBCUTANEOUS EVERY 2 HOUR PRN
Status: DISCONTINUED | OUTPATIENT
Start: 2025-08-06 | End: 2025-08-07

## 2025-08-06 RX ORDER — MIDAZOLAM HYDROCHLORIDE 1 MG/ML
1 INJECTION INTRAMUSCULAR; INTRAVENOUS EVERY 5 MIN PRN
Status: DISCONTINUED | OUTPATIENT
Start: 2025-08-06 | End: 2025-08-06 | Stop reason: HOSPADM

## 2025-08-06 RX ORDER — OXYCODONE HYDROCHLORIDE 5 MG/1
5 TABLET ORAL EVERY 4 HOURS PRN
Qty: 60 TABLET | Refills: 0 | Status: SHIPPED | OUTPATIENT
Start: 2025-08-06

## 2025-08-06 RX ORDER — KETOROLAC TROMETHAMINE 30 MG/ML
30 INJECTION, SOLUTION INTRAMUSCULAR; INTRAVENOUS EVERY 6 HOURS
Status: DISCONTINUED | OUTPATIENT
Start: 2025-08-06 | End: 2025-08-07

## 2025-08-06 RX ORDER — TRANEXAMIC ACID 10 MG/ML
1000 INJECTION, SOLUTION INTRAVENOUS ONCE
Status: COMPLETED | OUTPATIENT
Start: 2025-08-06 | End: 2025-08-07

## 2025-08-06 RX ORDER — ONDANSETRON 2 MG/ML
INJECTION INTRAMUSCULAR; INTRAVENOUS AS NEEDED
Status: DISCONTINUED | OUTPATIENT
Start: 2025-08-06 | End: 2025-08-06 | Stop reason: SURG

## 2025-08-06 RX ORDER — ASPIRIN 81 MG/1
81 TABLET ORAL 2 TIMES DAILY
Qty: 28 TABLET | Refills: 0 | Status: SHIPPED | OUTPATIENT
Start: 2025-08-06

## 2025-08-06 RX ORDER — SODIUM CHLORIDE, SODIUM LACTATE, POTASSIUM CHLORIDE, CALCIUM CHLORIDE 600; 310; 30; 20 MG/100ML; MG/100ML; MG/100ML; MG/100ML
INJECTION, SOLUTION INTRAVENOUS CONTINUOUS
Status: DISCONTINUED | OUTPATIENT
Start: 2025-08-06 | End: 2025-08-07

## 2025-08-06 RX ORDER — TIZANIDINE 2 MG/1
2 TABLET ORAL EVERY 6 HOURS PRN
Status: DISCONTINUED | OUTPATIENT
Start: 2025-08-06 | End: 2025-08-07

## 2025-08-06 RX ORDER — DOCUSATE SODIUM 100 MG/1
100 CAPSULE, LIQUID FILLED ORAL 2 TIMES DAILY
Status: DISCONTINUED | OUTPATIENT
Start: 2025-08-06 | End: 2025-08-07

## 2025-08-06 RX ORDER — SENNOSIDES 8.6 MG
17.2 TABLET ORAL NIGHTLY
Status: DISCONTINUED | OUTPATIENT
Start: 2025-08-06 | End: 2025-08-07

## 2025-08-06 RX ORDER — CYCLOBENZAPRINE HCL 10 MG
10 TABLET ORAL EVERY 8 HOURS PRN
Status: DISCONTINUED | OUTPATIENT
Start: 2025-08-06 | End: 2025-08-07

## 2025-08-06 RX ORDER — DIPHENHYDRAMINE HYDROCHLORIDE 50 MG/ML
12.5 INJECTION, SOLUTION INTRAMUSCULAR; INTRAVENOUS EVERY 4 HOURS PRN
Status: DISCONTINUED | OUTPATIENT
Start: 2025-08-06 | End: 2025-08-07

## 2025-08-06 RX ORDER — DIPHENHYDRAMINE HCL 25 MG
25 CAPSULE ORAL EVERY 4 HOURS PRN
Status: DISCONTINUED | OUTPATIENT
Start: 2025-08-06 | End: 2025-08-07

## 2025-08-06 RX ORDER — KETOROLAC TROMETHAMINE 30 MG/ML
INJECTION, SOLUTION INTRAMUSCULAR; INTRAVENOUS AS NEEDED
Status: DISCONTINUED | OUTPATIENT
Start: 2025-08-06 | End: 2025-08-06 | Stop reason: SURG

## 2025-08-06 RX ORDER — SODIUM PHOSPHATE, DIBASIC AND SODIUM PHOSPHATE, MONOBASIC 7; 19 G/230ML; G/230ML
1 ENEMA RECTAL ONCE AS NEEDED
Status: DISCONTINUED | OUTPATIENT
Start: 2025-08-06 | End: 2025-08-07

## 2025-08-06 RX ORDER — DEXAMETHASONE SODIUM PHOSPHATE 10 MG/ML
8 INJECTION, SOLUTION INTRAMUSCULAR; INTRAVENOUS ONCE
Status: COMPLETED | OUTPATIENT
Start: 2025-08-07 | End: 2025-08-07

## 2025-08-06 RX ORDER — NALOXONE HYDROCHLORIDE 0.4 MG/ML
80 INJECTION, SOLUTION INTRAMUSCULAR; INTRAVENOUS; SUBCUTANEOUS AS NEEDED
Status: DISCONTINUED | OUTPATIENT
Start: 2025-08-06 | End: 2025-08-06 | Stop reason: HOSPADM

## 2025-08-06 RX ORDER — OXYCODONE HYDROCHLORIDE 10 MG/1
10 TABLET ORAL EVERY 4 HOURS PRN
Status: DISCONTINUED | OUTPATIENT
Start: 2025-08-06 | End: 2025-08-07

## 2025-08-06 RX ORDER — PROCHLORPERAZINE EDISYLATE 5 MG/ML
5 INJECTION INTRAMUSCULAR; INTRAVENOUS EVERY 8 HOURS PRN
Status: DISCONTINUED | OUTPATIENT
Start: 2025-08-06 | End: 2025-08-07

## 2025-08-06 RX ORDER — POLYETHYLENE GLYCOL 3350 17 G/17G
17 POWDER, FOR SOLUTION ORAL DAILY PRN
Status: DISCONTINUED | OUTPATIENT
Start: 2025-08-06 | End: 2025-08-07

## 2025-08-06 RX ORDER — ACETAMINOPHEN 325 MG/1
650 TABLET ORAL ONCE
Status: COMPLETED | OUTPATIENT
Start: 2025-08-06 | End: 2025-08-06

## 2025-08-06 RX ORDER — ACETAMINOPHEN 500 MG
1000 TABLET ORAL ONCE
Status: DISCONTINUED | OUTPATIENT
Start: 2025-08-06 | End: 2025-08-06 | Stop reason: HOSPADM

## 2025-08-06 RX ORDER — ACETAMINOPHEN 500 MG
1000 TABLET ORAL ONCE AS NEEDED
Status: DISCONTINUED | OUTPATIENT
Start: 2025-08-06 | End: 2025-08-06 | Stop reason: HOSPADM

## 2025-08-06 RX ORDER — TRANEXAMIC ACID 10 MG/ML
INJECTION, SOLUTION INTRAVENOUS
Status: COMPLETED
Start: 2025-08-06 | End: 2025-08-06

## 2025-08-06 RX ORDER — HYDROCODONE BITARTRATE AND ACETAMINOPHEN 5; 325 MG/1; MG/1
1 TABLET ORAL ONCE AS NEEDED
Status: DISCONTINUED | OUTPATIENT
Start: 2025-08-06 | End: 2025-08-06 | Stop reason: HOSPADM

## 2025-08-06 RX ORDER — METOPROLOL TARTRATE 1 MG/ML
2.5 INJECTION, SOLUTION INTRAVENOUS ONCE
Status: DISCONTINUED | OUTPATIENT
Start: 2025-08-06 | End: 2025-08-06 | Stop reason: HOSPADM

## 2025-08-06 RX ORDER — ONDANSETRON 2 MG/ML
4 INJECTION INTRAMUSCULAR; INTRAVENOUS EVERY 6 HOURS PRN
Status: DISCONTINUED | OUTPATIENT
Start: 2025-08-06 | End: 2025-08-07

## 2025-08-06 RX ORDER — CARVEDILOL 6.25 MG/1
6.25 TABLET ORAL 2 TIMES DAILY WITH MEALS
Status: DISCONTINUED | OUTPATIENT
Start: 2025-08-06 | End: 2025-08-07

## 2025-08-06 RX ORDER — VENLAFAXINE HYDROCHLORIDE 75 MG/1
75 CAPSULE, EXTENDED RELEASE ORAL DAILY
Status: DISCONTINUED | OUTPATIENT
Start: 2025-08-06 | End: 2025-08-07

## 2025-08-06 RX ORDER — SCOPOLAMINE 1 MG/3D
1 PATCH, EXTENDED RELEASE TRANSDERMAL ONCE
Status: DISCONTINUED | OUTPATIENT
Start: 2025-08-06 | End: 2025-08-07

## 2025-08-06 RX ORDER — ACETAMINOPHEN 500 MG
1000 TABLET ORAL EVERY 8 HOURS SCHEDULED
Status: DISCONTINUED | OUTPATIENT
Start: 2025-08-06 | End: 2025-08-07

## 2025-08-06 RX ORDER — POTASSIUM CHLORIDE 1500 MG/1
1 TABLET, EXTENDED RELEASE ORAL NIGHTLY
Status: DISCONTINUED | OUTPATIENT
Start: 2025-08-06 | End: 2025-08-06

## 2025-08-06 RX ORDER — BISACODYL 10 MG
10 SUPPOSITORY, RECTAL RECTAL
Status: DISCONTINUED | OUTPATIENT
Start: 2025-08-06 | End: 2025-08-07

## 2025-08-06 RX ORDER — FERROUS SULFATE 325(65) MG
325 TABLET, DELAYED RELEASE (ENTERIC COATED) ORAL
Status: DISCONTINUED | OUTPATIENT
Start: 2025-08-06 | End: 2025-08-07

## 2025-08-06 RX ORDER — HYDROCODONE BITARTRATE AND ACETAMINOPHEN 5; 325 MG/1; MG/1
2 TABLET ORAL ONCE AS NEEDED
Status: DISCONTINUED | OUTPATIENT
Start: 2025-08-06 | End: 2025-08-06 | Stop reason: HOSPADM

## 2025-08-06 RX ORDER — ASPIRIN 81 MG/1
81 TABLET ORAL 2 TIMES DAILY
Status: DISCONTINUED | OUTPATIENT
Start: 2025-08-06 | End: 2025-08-07

## 2025-08-06 RX ORDER — ACETAMINOPHEN 325 MG/1
TABLET ORAL
Status: COMPLETED
Start: 2025-08-06 | End: 2025-08-06

## 2025-08-06 RX ORDER — OXYCODONE HYDROCHLORIDE 5 MG/1
5 TABLET ORAL EVERY 4 HOURS PRN
Status: DISCONTINUED | OUTPATIENT
Start: 2025-08-06 | End: 2025-08-07

## 2025-08-06 RX ORDER — CARVEDILOL 6.25 MG/1
6.25 TABLET ORAL 2 TIMES DAILY WITH MEALS
Status: DISCONTINUED | OUTPATIENT
Start: 2025-08-06 | End: 2025-08-06

## 2025-08-06 RX ORDER — DEXAMETHASONE SODIUM PHOSPHATE 4 MG/ML
VIAL (ML) INJECTION AS NEEDED
Status: DISCONTINUED | OUTPATIENT
Start: 2025-08-06 | End: 2025-08-06 | Stop reason: SURG

## 2025-08-06 RX ORDER — HYDROMORPHONE HYDROCHLORIDE 1 MG/ML
0.6 INJECTION, SOLUTION INTRAMUSCULAR; INTRAVENOUS; SUBCUTANEOUS EVERY 5 MIN PRN
Status: DISCONTINUED | OUTPATIENT
Start: 2025-08-06 | End: 2025-08-06 | Stop reason: HOSPADM

## 2025-08-06 RX ORDER — ONDANSETRON 2 MG/ML
4 INJECTION INTRAMUSCULAR; INTRAVENOUS EVERY 6 HOURS PRN
Status: DISCONTINUED | OUTPATIENT
Start: 2025-08-06 | End: 2025-08-06 | Stop reason: HOSPADM

## 2025-08-06 RX ORDER — TRAMADOL HYDROCHLORIDE 50 MG/1
50 TABLET ORAL EVERY 6 HOURS SCHEDULED
Status: DISCONTINUED | OUTPATIENT
Start: 2025-08-06 | End: 2025-08-07

## 2025-08-06 RX ORDER — DIPHENHYDRAMINE HYDROCHLORIDE 50 MG/ML
25 INJECTION, SOLUTION INTRAMUSCULAR; INTRAVENOUS ONCE AS NEEDED
Status: ACTIVE | OUTPATIENT
Start: 2025-08-06 | End: 2025-08-06

## 2025-08-06 RX ORDER — PROCHLORPERAZINE EDISYLATE 5 MG/ML
5 INJECTION INTRAMUSCULAR; INTRAVENOUS EVERY 8 HOURS PRN
Status: DISCONTINUED | OUTPATIENT
Start: 2025-08-06 | End: 2025-08-06 | Stop reason: HOSPADM

## 2025-08-06 RX ADMIN — KETOROLAC TROMETHAMINE 30 MG: 30 INJECTION, SOLUTION INTRAMUSCULAR; INTRAVENOUS at 07:51:00

## 2025-08-06 RX ADMIN — ONDANSETRON 4 MG: 2 INJECTION INTRAMUSCULAR; INTRAVENOUS at 07:09:00

## 2025-08-06 RX ADMIN — DEXAMETHASONE SODIUM PHOSPHATE 4 MG: 4 MG/ML VIAL (ML) INJECTION at 07:00:00

## 2025-08-06 RX ADMIN — TRANEXAMIC ACID 1000 MG: 10 INJECTION, SOLUTION INTRAVENOUS at 06:53:00

## 2025-08-06 RX ADMIN — SODIUM CHLORIDE, SODIUM LACTATE, POTASSIUM CHLORIDE, CALCIUM CHLORIDE: 600; 310; 30; 20 INJECTION, SOLUTION INTRAVENOUS at 07:51:00

## 2025-08-06 RX ADMIN — MIDAZOLAM HYDROCHLORIDE 2 MG: 1 INJECTION INTRAMUSCULAR; INTRAVENOUS at 06:53:00

## 2025-08-06 RX ADMIN — DEXAMETHASONE SODIUM PHOSPHATE 4 MG: 4 MG/ML VIAL (ML) INJECTION at 07:09:00

## 2025-08-06 RX ADMIN — SODIUM CHLORIDE, SODIUM LACTATE, POTASSIUM CHLORIDE, CALCIUM CHLORIDE: 600; 310; 30; 20 INJECTION, SOLUTION INTRAVENOUS at 08:03:00

## 2025-08-07 ENCOUNTER — TELEPHONE (OUTPATIENT)
Dept: FAMILY MEDICINE CLINIC | Facility: CLINIC | Age: 50
End: 2025-08-07

## 2025-08-07 VITALS
DIASTOLIC BLOOD PRESSURE: 59 MMHG | HEIGHT: 64 IN | SYSTOLIC BLOOD PRESSURE: 136 MMHG | RESPIRATION RATE: 16 BRPM | BODY MASS INDEX: 31.48 KG/M2 | WEIGHT: 184.38 LBS | HEART RATE: 76 BPM | TEMPERATURE: 98 F | OXYGEN SATURATION: 100 %

## 2025-08-07 DIAGNOSIS — I10 ESSENTIAL HYPERTENSION: ICD-10-CM

## 2025-08-07 LAB
HCT VFR BLD AUTO: 29.9 % (ref 35–48)
HGB BLD-MCNC: 10.5 G/DL (ref 12–16)

## 2025-08-07 PROCEDURE — 85018 HEMOGLOBIN: CPT | Performed by: ORTHOPAEDIC SURGERY

## 2025-08-07 PROCEDURE — 97116 GAIT TRAINING THERAPY: CPT

## 2025-08-07 PROCEDURE — 97535 SELF CARE MNGMENT TRAINING: CPT

## 2025-08-07 PROCEDURE — 85014 HEMATOCRIT: CPT | Performed by: ORTHOPAEDIC SURGERY

## 2025-08-07 PROCEDURE — 97165 OT EVAL LOW COMPLEX 30 MIN: CPT

## 2025-08-07 PROCEDURE — 97530 THERAPEUTIC ACTIVITIES: CPT

## 2025-08-07 RX ORDER — CARVEDILOL 12.5 MG/1
6.25 TABLET ORAL 2 TIMES DAILY WITH MEALS
COMMUNITY
Start: 2025-08-07 | End: 2025-08-07

## 2025-08-07 RX ORDER — CARVEDILOL 12.5 MG/1
12.5 TABLET ORAL 2 TIMES DAILY WITH MEALS
Qty: 180 TABLET | Refills: 0 | Status: SHIPPED | OUTPATIENT
Start: 2025-08-07 | End: 2025-11-05

## 2025-08-19 ENCOUNTER — TELEMEDICINE (OUTPATIENT)
Dept: FAMILY MEDICINE CLINIC | Facility: CLINIC | Age: 50
End: 2025-08-19

## 2025-08-19 DIAGNOSIS — I10 PRIMARY HYPERTENSION: Primary | ICD-10-CM

## 2025-08-19 DIAGNOSIS — Z96.651 STATUS POST RIGHT KNEE REPLACEMENT: ICD-10-CM

## 2025-08-19 PROBLEM — Z96.659 S/P KNEE REPLACEMENT: Status: RESOLVED | Noted: 2025-08-06 | Resolved: 2025-08-19

## 2025-08-19 PROCEDURE — 99214 OFFICE O/P EST MOD 30 MIN: CPT | Performed by: INTERNAL MEDICINE

## 2025-08-22 RX ORDER — VENLAFAXINE HYDROCHLORIDE 75 MG/1
75 CAPSULE, EXTENDED RELEASE ORAL DAILY
Qty: 90 CAPSULE | Refills: 2 | Status: SHIPPED | OUTPATIENT
Start: 2025-08-22

## (undated) DIAGNOSIS — M25.562 PAIN IN BOTH KNEES, UNSPECIFIED CHRONICITY: Primary | ICD-10-CM

## (undated) DIAGNOSIS — M25.561 PAIN IN BOTH KNEES, UNSPECIFIED CHRONICITY: Primary | ICD-10-CM

## (undated) DEVICE — SUTURE VICRYL 0

## (undated) DEVICE — Device

## (undated) DEVICE — ZIMMER® STERILE DISPOSABLE TOURNIQUET CUFF WITH PLC, DUAL PORT, SINGLE BLADDER, 34 IN. (86 CM)

## (undated) DEVICE — ELECTRO LUBE IS A SINGLE PATIENT USE DEVICE THAT IS INTENDED TO BE USED ON ELECTROSURGICAL ELECTRODES TO REDUCE STICKING.: Brand: KEY SURGICAL ELECTRO LUBE

## (undated) DEVICE — MEDI-VAC NON-CONDUCTIVE SUCTION TUBING: Brand: CARDINAL HEALTH

## (undated) DEVICE — SUTURE VICRYL 3-0 SH

## (undated) DEVICE — DRESSING AQUACEL W/ SILVER 3.5 X12 IN

## (undated) DEVICE — GYN CDS: Brand: MEDLINE INDUSTRIES, INC.

## (undated) DEVICE — DV OBTURATOR BLADELESS 8MM

## (undated) DEVICE — GOWN SUR 2XL LEV 4 BLU W/ WHT V NK BND AERO

## (undated) DEVICE — COVER LT HNDL RIG FOR SUR CAM DISP

## (undated) DEVICE — COVER,MAYO STAND,STERILE: Brand: MEDLINE

## (undated) DEVICE — DRAPE SURG L W60XL84IN SMS POLYPR U SHP FLM

## (undated) DEVICE — SOL H2O IV

## (undated) DEVICE — GLOVE SUR 7.5 SENSICARE PI PIP CRM PWD F

## (undated) DEVICE — PROGRASP FORCEPS: Brand: ENDOWRIST;DAVINCI SI

## (undated) DEVICE — TUBING CYSTO

## (undated) DEVICE — 40580 - THE PINK PAD - ADVANCED TRENDELENBURG POSITIONING KIT: Brand: 40580 - THE PINK PAD - ADVANCED TRENDELENBURG POSITIONING KIT

## (undated) DEVICE — SUTURE ETHILON 5-0 PS-3

## (undated) DEVICE — PACK PBDS TOTAL KNEE MODULE

## (undated) DEVICE — MONOPOLAR CURVED SCISSORS: Brand: ENDOWRIST;DAVINCI SI

## (undated) DEVICE — VIOLET BRAIDED (POLYGLACTIN 910), SYNTHETIC ABSORBABLE SUTURE: Brand: COATED VICRYL

## (undated) DEVICE — KENDALL SCD EXPRESS SLEEVES, KNEE LENGTH, MEDIUM: Brand: KENDALL SCD

## (undated) DEVICE — SUTURE VICRYL 2-0 CT-1

## (undated) DEVICE — VCARE MEDIUM, UTERINE MANIPULATOR, VAGINAL-CERVICAL-AHLUWALIA'S-RETRACTOR-ELEVATOR: Brand: VCARE

## (undated) DEVICE — SLEEVE COMPR MD KNEE LEN SGL USE KENDALL SCD

## (undated) DEVICE — SUT VCRL + 1 27IN ABSRB UD OS-6 L36MM

## (undated) DEVICE — TIP COVER ACCESSORY

## (undated) DEVICE — [AGGRESSIVE PLUS CUTTER, ARTHROSCOPIC SHAVER BLADE,  DO NOT RESTERILIZE,  DO NOT USE IF PACKAGE IS DAMAGED,  KEEP DRY,  KEEP AWAY FROM SUNLIGHT]: Brand: FORMULA

## (undated) DEVICE — NEEDLE SPNL 18GA L3.5IN PNK QNCKE STYL DISP

## (undated) DEVICE — STERILE POLYISOPRENE POWDER-FREE SURGICAL GLOVES: Brand: PROTEXIS

## (undated) DEVICE — NON-ADHERENT STRIPS,OIL EMULSION: Brand: CURITY

## (undated) DEVICE — SUTURE MONOCRYL 4-0 PS-2

## (undated) DEVICE — GLOVE SUR 8.5 SENSICARE PI PIP GRN PWD F

## (undated) DEVICE — GLOVE SURG SENSICARE SZ 7-1/2

## (undated) DEVICE — REM POLYHESIVE ADULT PATIENT RETURN ELECTRODE: Brand: VALLEYLAB

## (undated) DEVICE — PK DISSECTING FORCEPS: Brand: ENDOWRIST;DAVINCI SI

## (undated) DEVICE — GLOVE SUR 8 SENSICARE PI PIP CRM PWD F

## (undated) DEVICE — SOLUTION IRRIG 3000ML 0.9% NACL FLX CONT

## (undated) DEVICE — SEALER BPLR ELECTRD 3.48X5.74MM CNTR

## (undated) DEVICE — SUTURE VICRYL 1 CT-1

## (undated) DEVICE — 10K/24K ARTHROSCOPY INFLOW TUBE SET: Brand: 10K/24K

## (undated) DEVICE — DERMABOND LIQUID ADHESIVE

## (undated) DEVICE — SUTURE VICRYL 0 CT-1

## (undated) DEVICE — HOOD STERI-SHIELD 408-800-000

## (undated) DEVICE — GLOVE SURG SENSICARE SZ 8-1/2

## (undated) DEVICE — SUTURE VLOC 90 2-0 9\" 2145

## (undated) DEVICE — WRAP COMPR UNIV KNEE HOT CLD GEL MICWV AND

## (undated) DEVICE — SYRINGE MED 30ML STD CLR PLAS LL TIP N CTRL

## (undated) DEVICE — SOL  .9 1000ML BTL

## (undated) DEVICE — PIN DRL THRD HDLSS SIG

## (undated) DEVICE — ZZ--CONVERTED-TO-500976-PENCIL SMK EVAC L10FT MPLR BLDE JAW OPN

## (undated) DEVICE — GLOVE SURG TRIUMPH SZ 6-1/2

## (undated) DEVICE — #15 STERILE STAINLESS BLADE: Brand: STERILE STAINLESS BLADES

## (undated) DEVICE — SOL H2O 1000ML BTL

## (undated) DEVICE — GLOVE SUR 8.5 SENSICARE PI PIP CRM PWD F

## (undated) DEVICE — BULB SYRINGE,IRRIGATION WITH PROTECTIVE CAP: Brand: DOVER

## (undated) DEVICE — RETRACTOR LONE STAR STAYS LG

## (undated) DEVICE — KNEE ARTHROSCOPY CDS: Brand: MEDLINE INDUSTRIES, INC.

## (undated) DEVICE — GYN LAP/ROBOTIC: Brand: MEDLINE INDUSTRIES, INC.

## (undated) DEVICE — HOOD STERI-SHIELD 408-800-100

## (undated) DEVICE — BAG DRAIN INFECTION CNTRL 2000

## (undated) DEVICE — 2, DISPOSABLE SUCTION/IRRIGATOR WITHOUT DISPOSABLE TIP: Brand: STRYKEFLOW

## (undated) DEVICE — DV KIT ACCESSORY 3-ARM DISP

## (undated) DEVICE — PADDING CAST COTTON  4

## (undated) DEVICE — TROCAR: Brand: KII FIOS FIRST ENTRY

## (undated) DEVICE — SOL  .9 3000ML

## (undated) DEVICE — STANDARD HYPODERMIC NEEDLE,POLYPROPYLENE HUB: Brand: MONOJECT

## (undated) DEVICE — GLOVE SURG SENSICARE SZ 6-1/2

## (undated) NOTE — ED AVS SNAPSHOT
THE [facility] in [place] [address] [facility] [address] [address]    Phone:  [phone]    Fax:  [address]   MRN: [id number]    Department:  THE [facility] [place]   Date of Visit:  3/24/2017           Garcia Zamudio - Meclizine HCl 25 MG Tabs              Discharge Instructions         · Rx Augmentin 875 mg twice daily for the next 10 days.  Take daily probiotics to avoid GI distress from the antibiotic   · Rx Flonase NS 2 puffs each nostril once daily to help with sin To Check ER Wait Times:  TEXT 'ERwait' to 01680      Click [email or web address]      Or call [phone]    If you have any problems with your follow-up, please call our  at [phone].     Si usted tiene algun problema con smith sequimiento, por I have read and understand the instructions given to me by my caregivers. 24[facility]Hour Pharmacies        Pharmacy Address Phone Number   [address] ([address]) [place] If you've recently had a stay at the Hospital you can access your discharge instructions in hive01 by going to Visits < Admission Summaries.  If you've been to the Emergency Department or your doctor's office, you can view your past visit information in My

## (undated) NOTE — LETTER
[facility]  [address] [place] [address],[address]  [facility]  Office: [phone]  Fax: [phone]  [email or web address]      Date:  2/4/2019     Patient:  Dar Gay         To Whom it may concern:     This letter has been written

## (undated) NOTE — LETTER
Date: 6/18/2020    Patient Name: Claudy Cheng          To Whom it may concern: This letter has been written at the patient's request. The above patient was seen at the [facility] for treatment of a medical condition.     The patient ma

## (undated) NOTE — IP AVS SNAPSHOT
[facility] [place] [address] [place], [address] ~ [phone]                Discharge Summary   2/24/2017    Rosa Amos           Admission Information        Provider Department    2/24/2017 Greta Dawkins MD  Pre-Op / [facility] Instructions Following Your Knee Arthroscopy     Khalif Leyva-  We hope you were pleased with your care at [facility].  We wish you the best outcome and overall experience with your operation.   These instructions will help to minimize pain and · Do not exceed 8(eight) tablets each day  · Do not take Norco, if you do not have pain    Over-The-Counter Medication  Non-prescription anti-inflammatory medications can also help to ease the pain.   · You can take Aleve or ibuprofen   · Take as directed o care possible. Your trust in them is greatly appreciated. Thanks so much,  Dr. Jey Beauchamp Discharge Instruction Sheet    Remove patch at 09:00 a.m.  On 2/25    The Transderm Scopolamine Patch is a 24 hour patch that helps to pr Specialty:  SURGERY, ORTHOPEDIC    Contact information:    [address] [id number] [phone]        Immunization History as of 2/24/2017  Never Reviewed    MENINGOCOCCAL 9/12/2013    MMR 9/12/2012      Radiology Exams     None For medical emergencies, dial 911.

## (undated) NOTE — Clinical Note
Patient interested in knee replacement, ideally around June. She expressed interest in simultaneous bilateral knee replacement and wanted me to ask Dr. Coles about this, understanding that because of the heightened difficulty with recovery, prolonged anesthesia time, and logistical factors that simultaneous bilateral knee replacement might not be an option. For now Sandor, do you mind scheduling her for a single knee replacement (either right or left, she is still debating internally which laterality she wants to replace first). Also Sandor, do you mind scheduling an appointment with Dr. Coles for surgical discussion? Next available is fine. I provided her a binder but it would help to see him.

## (undated) NOTE — LETTER
[facility]  [address] [place] [address],[address]  [place]  Office: [phone]  Fax: [phone]  [email or web address]      Date:  9/18/2018     Patient:  Connorana Foxneftali         To Whom It May Concern:     The above names patient is u

## (undated) NOTE — LETTER
[facility]ARE IN [place]  [address] [address]  Dept: [phone]  Dept Fax: [phone]      March 24, 2017    Patient: Fito Sample   Date of Visit: 3/24/2017       To Whom It May Concern:    Fito Barone was seen and jud

## (undated) NOTE — LETTER
Date: 6/11/2020    Patient Name: Fito Castañeda          To Whom it may concern: This letter has been written at the patient's request. The above patient was seen at the [facility] for treatment of a medical condition.     This patient s